# Patient Record
Sex: FEMALE | Race: WHITE
[De-identification: names, ages, dates, MRNs, and addresses within clinical notes are randomized per-mention and may not be internally consistent; named-entity substitution may affect disease eponyms.]

---

## 2016-01-30 VITALS — DIASTOLIC BLOOD PRESSURE: 75 MMHG | SYSTOLIC BLOOD PRESSURE: 152 MMHG

## 2017-03-21 ENCOUNTER — HOSPITAL ENCOUNTER (OUTPATIENT)
Dept: HOSPITAL 61 - LAB | Age: 62
Discharge: HOME | End: 2017-03-21
Attending: NURSE PRACTITIONER
Payer: COMMERCIAL

## 2017-03-21 DIAGNOSIS — N39.0: Primary | ICD-10-CM

## 2017-03-21 LAB
ANION GAP SERPL CALC-SCNC: 11 MMOL/L (ref 6–14)
BUN SERPL-MCNC: 15 MG/DL (ref 7–20)
CALCIUM SERPL-MCNC: 9.7 MG/DL (ref 8.5–10.1)
CHLORIDE SERPL-SCNC: 102 MMOL/L (ref 98–107)
CO2 SERPL-SCNC: 30 MMOL/L (ref 21–32)
CREAT SERPL-MCNC: 1.1 MG/DL (ref 0.6–1)
GFR SERPLBLD BASED ON 1.73 SQ M-ARVRAT: 50 ML/MIN
GLUCOSE SERPL-MCNC: 112 MG/DL (ref 70–99)
POTASSIUM SERPL-SCNC: 3.2 MMOL/L (ref 3.5–5.1)
SODIUM SERPL-SCNC: 143 MMOL/L (ref 136–145)

## 2017-03-21 PROCEDURE — 36415 COLL VENOUS BLD VENIPUNCTURE: CPT

## 2017-03-21 PROCEDURE — 80048 BASIC METABOLIC PNL TOTAL CA: CPT

## 2017-03-30 ENCOUNTER — HOSPITAL ENCOUNTER (OUTPATIENT)
Dept: HOSPITAL 61 - ENDOS | Age: 62
Discharge: HOME | End: 2017-03-30
Attending: INTERNAL MEDICINE
Payer: COMMERCIAL

## 2017-03-30 VITALS
DIASTOLIC BLOOD PRESSURE: 54 MMHG | SYSTOLIC BLOOD PRESSURE: 119 MMHG | SYSTOLIC BLOOD PRESSURE: 119 MMHG | DIASTOLIC BLOOD PRESSURE: 54 MMHG | DIASTOLIC BLOOD PRESSURE: 54 MMHG | DIASTOLIC BLOOD PRESSURE: 54 MMHG | SYSTOLIC BLOOD PRESSURE: 119 MMHG | SYSTOLIC BLOOD PRESSURE: 119 MMHG | DIASTOLIC BLOOD PRESSURE: 54 MMHG | SYSTOLIC BLOOD PRESSURE: 119 MMHG | SYSTOLIC BLOOD PRESSURE: 119 MMHG | DIASTOLIC BLOOD PRESSURE: 54 MMHG

## 2017-03-30 DIAGNOSIS — E78.5: ICD-10-CM

## 2017-03-30 DIAGNOSIS — M19.90: ICD-10-CM

## 2017-03-30 DIAGNOSIS — I10: ICD-10-CM

## 2017-03-30 DIAGNOSIS — K22.70: Primary | ICD-10-CM

## 2017-03-30 DIAGNOSIS — K29.50: ICD-10-CM

## 2017-03-30 DIAGNOSIS — F32.9: ICD-10-CM

## 2017-03-30 DIAGNOSIS — J44.9: ICD-10-CM

## 2017-03-30 DIAGNOSIS — F17.200: ICD-10-CM

## 2017-03-30 DIAGNOSIS — F41.9: ICD-10-CM

## 2017-03-30 DIAGNOSIS — J45.909: ICD-10-CM

## 2017-03-30 PROCEDURE — 43239 EGD BIOPSY SINGLE/MULTIPLE: CPT

## 2017-03-31 NOTE — PATHOLOGY
PATHOLOGY REPORT 

 

*    *    *    *    *    *    *    * 

 FINAL DIAGNOSIS:

Esophagus, distal, biopsy:

- Hyperplastic squamous epithelium without significant inflammation.

- Scant adjacent columnar epithelium with mild to moderate chronic

inflammation and no evidence of intestinal metaplasia.

(OLGA:; d/t: 3/31/17) 

 

 

REPORT ELECTRONICALLY SIGNED BY:   ZELALEM Blackwood M.D.

DATE/TIME:   3/31/2017 11:33

*    *    *    *    *    *    *    *

 

GROSS PATHOLOGY:

Received in formalin labeled "Celestino Garcia and distal esophageal

bx," are 4 segments of tan soft tissue measuring 1.7 x 0.2 x 0.2 cm

in aggregate dimensions and ranging from 0.3 to 0.6 cm in maximum

dimension.  The specimen is submitted entirely in cassette A1.

(TTL; 3/30/2017)

 

 

 INITIAL CPT CODE(S):

A; 89605

Professional services performed by [facility] at 

[place] [address]

 

  Technical services performed by [facility] at [address] [place] [address], [facility]

[address] [address].

  

 SPECIMEN(S) RECEIVED:

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

 

 CLINICAL HISTORY:

Chandler's, GERD

 

 PATIENT:  CELESTINO GARCIA

/AGE:  1955 (Age: 62)  

PATIENT #:  [id number]

ACCESSION #:  [id number]          ALT CASE #:   

SPECIMEN COLLECTION DATE:  3/30/2017

SPECIMEN RECEIVED DATE:  3/30/2017

   

[facility] - [address] [place] [address] - PHONE: 

[phone]

* * *  END OF REPORT  * * *

## 2017-07-27 ENCOUNTER — HOSPITAL ENCOUNTER (OUTPATIENT)
Dept: HOSPITAL 61 - LAB | Age: 62
Discharge: HOME | End: 2017-07-27
Attending: NURSE PRACTITIONER
Payer: COMMERCIAL

## 2017-07-27 DIAGNOSIS — Z01.419: Primary | ICD-10-CM

## 2017-07-27 LAB
ALBUMIN SERPL-MCNC: 3.6 G/DL (ref 3.4–5)
ALBUMIN/GLOB SERPL: 1 {RATIO} (ref 1–1.7)
ALP SERPL-CCNC: 156 U/L (ref 46–116)
ALT SERPL-CCNC: 20 U/L (ref 14–59)
ANION GAP SERPL CALC-SCNC: 9 MMOL/L (ref 6–14)
AST SERPL-CCNC: 26 U/L (ref 15–37)
BASOPHILS # BLD AUTO: 0 X10^3/UL (ref 0–0.2)
BASOPHILS NFR BLD: 1 % (ref 0–3)
BILIRUB SERPL-MCNC: 0.5 MG/DL (ref 0.2–1)
BUN SERPL-MCNC: 18 MG/DL (ref 7–20)
BUN/CREAT SERPL: 18 (ref 6–20)
CALCIUM SERPL-MCNC: 9.3 MG/DL (ref 8.5–10.1)
CHLORIDE SERPL-SCNC: 106 MMOL/L (ref 98–107)
CHOLEST SERPL-MCNC: 151 MG/DL (ref 0–200)
CHOLEST/HDLC SERPL: 3.1 {RATIO}
CK SERPL-CCNC: 248 U/L (ref 26–192)
CKMB MASS: 1.8 NG/ML (ref 0–3.6)
CO2 SERPL-SCNC: 29 MMOL/L (ref 21–32)
CREAT SERPL-MCNC: 1 MG/DL (ref 0.6–1)
EOSINOPHIL NFR BLD: 4 % (ref 0–3)
ERYTHROCYTE [DISTWIDTH] IN BLOOD BY AUTOMATED COUNT: 14.2 % (ref 11.5–14.5)
GFR SERPLBLD BASED ON 1.73 SQ M-ARVRAT: 55.8 ML/MIN
GLOBULIN SER-MCNC: 3.7 G/DL (ref 2.2–3.8)
GLUCOSE SERPL-MCNC: 100 MG/DL (ref 70–99)
HCT VFR BLD CALC: 35.9 % (ref 36–47)
HDLC SERPL-MCNC: 49 MG/DL (ref 40–60)
HGB BLD-MCNC: 12.5 G/DL (ref 12–15.5)
LYMPHOCYTES # BLD: 1.5 X10^3/UL (ref 1–4.8)
LYMPHOCYTES NFR BLD AUTO: 36 % (ref 24–48)
MCH RBC QN AUTO: 30 PG (ref 25–35)
MCHC RBC AUTO-ENTMCNC: 35 G/DL (ref 31–37)
MCV RBC AUTO: 86 FL (ref 79–100)
MONOCYTES NFR BLD: 8 % (ref 0–9)
NEUTROPHILS NFR BLD AUTO: 52 % (ref 31–73)
NONHDLC SERPL-MCNC: 102 MG/DL (ref 0–129)
PLATELET # BLD AUTO: 220 X10^3/UL (ref 140–400)
POTASSIUM SERPL-SCNC: 3.9 MMOL/L (ref 3.5–5.1)
PROT SERPL-MCNC: 7.3 G/DL (ref 6.4–8.2)
RBC # BLD AUTO: 4.16 X10^6/UL (ref 3.5–5.4)
SODIUM SERPL-SCNC: 144 MMOL/L (ref 136–145)
TRIGL SERPL-MCNC: 85 MG/DL (ref 0–150)
WBC # BLD AUTO: 4.1 X10^3/UL (ref 4–11)

## 2017-07-27 PROCEDURE — 80053 COMPREHEN METABOLIC PANEL: CPT

## 2017-07-27 PROCEDURE — 85027 COMPLETE CBC AUTOMATED: CPT

## 2017-07-27 PROCEDURE — 80061 LIPID PANEL: CPT

## 2017-07-27 PROCEDURE — 84443 ASSAY THYROID STIM HORMONE: CPT

## 2017-07-27 PROCEDURE — 82306 VITAMIN D 25 HYDROXY: CPT

## 2017-07-27 PROCEDURE — 82553 CREATINE MB FRACTION: CPT

## 2017-07-27 PROCEDURE — 36415 COLL VENOUS BLD VENIPUNCTURE: CPT

## 2017-08-28 ENCOUNTER — HOSPITAL ENCOUNTER (OUTPATIENT)
Dept: HOSPITAL 61 - LAB | Age: 62
Discharge: HOME | End: 2017-08-28
Attending: NURSE PRACTITIONER
Payer: COMMERCIAL

## 2017-08-28 DIAGNOSIS — R74.8: Primary | ICD-10-CM

## 2017-08-28 PROCEDURE — 36415 COLL VENOUS BLD VENIPUNCTURE: CPT

## 2017-08-28 PROCEDURE — 84075 ASSAY ALKALINE PHOSPHATASE: CPT

## 2017-09-01 ENCOUNTER — HOSPITAL ENCOUNTER (OUTPATIENT)
Dept: HOSPITAL 61 - CT | Age: 62
Discharge: HOME | End: 2017-09-01
Attending: NURSE PRACTITIONER
Payer: COMMERCIAL

## 2017-09-01 ENCOUNTER — HOSPITAL ENCOUNTER (OUTPATIENT)
Dept: HOSPITAL 61 - US | Age: 62
Discharge: HOME | End: 2017-09-01
Attending: INTERNAL MEDICINE
Payer: COMMERCIAL

## 2017-09-01 DIAGNOSIS — J98.11: ICD-10-CM

## 2017-09-01 DIAGNOSIS — I87.2: Primary | ICD-10-CM

## 2017-09-01 DIAGNOSIS — D35.01: ICD-10-CM

## 2017-09-01 DIAGNOSIS — D35.01: Primary | ICD-10-CM

## 2017-09-01 PROCEDURE — 71250 CT THORAX DX C-: CPT

## 2017-09-01 PROCEDURE — 74150 CT ABDOMEN W/O CONTRAST: CPT

## 2017-09-01 PROCEDURE — 93970 EXTREMITY STUDY: CPT

## 2017-09-01 NOTE — RAD
Exam performed: CT chest and abdomen without contrast.



History: Follow-up pulmonary and adrenal nodule.



Date of service: 09/01/17. Comparison: CT chest and abdomen from 09/09/16.



Technique: Contiguous helical acquisitions are obtained through the chest and

abdomen without IV contrast. Sagittal and coronal reformatted images are

obtained and reviewed.



CT chest findings:



Structures at the thoracic inlet through both lobes of the thyroid gland

appear normal. Level IV contrast limits evaluation of neck and intrathoracic

great vessels, however they appear normal in course and caliber. Atheromatous

calcification of the aorta and coronary arteries is seen. No dominant

mediastinal or hilar adenopathy is seen. Heart size is within limits of normal

without pericardial effusion. Interrogation of lungs demonstrates no focal

infiltrates or nodules. Previously seen tiny peripheral nodule in the right

apex is unchanged. There is linear right basilar atelectasis. No pleural

effusion or pneumothorax. Interrogation of bone windows demonstrates no bony

abnormalities. Mild spondylotic changes are seen.



Impression:



1.  Previously seen tiny parenchymal opacity in the right upper lobe appears

similar End impression.

2.  No additional parenchymal abnormalities noted.



End impression



CT abdomen findings:



Low attenuating 2.8 x 2.6 cm right adrenal nodule is stable.



Lack of IV contrast limits evaluation of abdominal viscera however the liver,

spleen and pancreas appear normal. Cholecystectomy. Left adrenal gland and

bilateral kidneys are normal. There is no hydronephrosis or perinephric

stranding. Mild atheromatous calcification of the aorta. Small and large bowel

loops are nondilated and unremarkable. Scattered stool in the colon. No free

fluid. Bones are normal.



Impression:



1.  Stable low attenuating right adrenal nodule system with residual adenoma.

2.  No additional abnormality seen.



PQRS Compliance Statement:



One or more of the following individualized dose reduction techniques were

utilized for this examination:

1. Automated exposure control

2. Adjustment of the mA and/or kV according to patient size

3. Use of iterative reconstruction technique

## 2017-09-27 ENCOUNTER — HOSPITAL ENCOUNTER (OUTPATIENT)
Dept: HOSPITAL 61 - US | Age: 62
Discharge: HOME | End: 2017-09-27
Attending: INTERNAL MEDICINE
Payer: COMMERCIAL

## 2017-09-27 DIAGNOSIS — Z98.890: ICD-10-CM

## 2017-09-27 DIAGNOSIS — I82.493: Primary | ICD-10-CM

## 2017-09-27 PROCEDURE — 93970 EXTREMITY STUDY: CPT

## 2017-09-29 NOTE — RAD
--------------- APPROVED REPORT --------------





Bilateral Lower Extremity Venous Study for DVT

Patient Location: OUT-PATIENT



Indications

s/p b/l gsv ablations



Vein Imaging (Right)

CFV (R): Compressible

SFJ (R): Compressible

FEM (R): Compressible

POP (R): Compressible

DFV (R): Compressible

PTV (R): Spontaneous

GSV (R): Absent Flow

Peroneals (R): Spontaneous



Vein Imaging (Left)

CFV (L): Compressible

SFJ (L): Compressible

FEM (L): Compressible

POP (L): Compressible

DFV (L): Compressible

PTV (L): Spontaneous

GSV (L): Absent Flow

Peroneals (L): Spontaneous



Doppler Evaluation (Right)

CFV (R): Spontaneous

POP (R):Spontaneous



Doppler Evaluation (Left)

CFV (L):Spontaneous

POP (L):Spontaneous



Findings

The bilateral deep veins of the lower extremities were evaluated for thrombus. The bilateral saphenof
emoral junctions and common femoral veins do not demonstrate any evidence of thrombus and are fully c
ompressible. The bilateral saphenofemoral veins were not well visualized but grossly do not demonstra
te any evidence of thrombus and appear to be compressible with normal color Doppler and spectral flow
. Bilateral popliteal veins are compressible with normal spectral flow. The below-knee veins are not 
well visualized but demonstrate spontaneous flow. The bilateral greater saphenous veins on limited pr
oximal imaging appear to demonstrate no evidence of flow consistent with recent history of ablation.



Critical Notification

Critical Value: No



<Conclusion>

Negative for thrombus in the bilateral deep veins of the lower extremities

Successful bilateral greater saphenous vein ablations.

## 2017-10-25 ENCOUNTER — HOSPITAL ENCOUNTER (EMERGENCY)
Dept: HOSPITAL 61 - ER | Age: 62
Discharge: HOME | End: 2017-10-25
Payer: COMMERCIAL

## 2017-10-25 VITALS
DIASTOLIC BLOOD PRESSURE: 79 MMHG | SYSTOLIC BLOOD PRESSURE: 173 MMHG | DIASTOLIC BLOOD PRESSURE: 79 MMHG | SYSTOLIC BLOOD PRESSURE: 173 MMHG | SYSTOLIC BLOOD PRESSURE: 173 MMHG | DIASTOLIC BLOOD PRESSURE: 79 MMHG | DIASTOLIC BLOOD PRESSURE: 79 MMHG | SYSTOLIC BLOOD PRESSURE: 173 MMHG | SYSTOLIC BLOOD PRESSURE: 173 MMHG | DIASTOLIC BLOOD PRESSURE: 79 MMHG | SYSTOLIC BLOOD PRESSURE: 173 MMHG | DIASTOLIC BLOOD PRESSURE: 79 MMHG | SYSTOLIC BLOOD PRESSURE: 173 MMHG | SYSTOLIC BLOOD PRESSURE: 173 MMHG | DIASTOLIC BLOOD PRESSURE: 79 MMHG | DIASTOLIC BLOOD PRESSURE: 79 MMHG

## 2017-10-25 DIAGNOSIS — E78.00: ICD-10-CM

## 2017-10-25 DIAGNOSIS — F32.9: ICD-10-CM

## 2017-10-25 DIAGNOSIS — F41.9: ICD-10-CM

## 2017-10-25 DIAGNOSIS — Z91.041: ICD-10-CM

## 2017-10-25 DIAGNOSIS — I10: ICD-10-CM

## 2017-10-25 DIAGNOSIS — N30.00: Primary | ICD-10-CM

## 2017-10-25 DIAGNOSIS — Z88.8: ICD-10-CM

## 2017-10-25 DIAGNOSIS — J44.9: ICD-10-CM

## 2017-10-25 DIAGNOSIS — K21.9: ICD-10-CM

## 2017-10-25 LAB
BACTERIA #/AREA URNS HPF: (no result) /HPF
BILIRUB UR QL STRIP: NEGATIVE
GLUCOSE UR STRIP-MCNC: NEGATIVE MG/DL
NITRITE UR QL STRIP: NEGATIVE
PH UR STRIP: 5.5 [PH]
PROT UR STRIP-MCNC: 30 MG/DL
SP GR UR STRIP: 1.02
SQUAMOUS #/AREA URNS LPF: (no result) /LPF
UROBILINOGEN UR-MCNC: 0.2 MG/DL
WBC #/AREA URNS HPF: >40 /HPF (ref 0–4)

## 2017-10-25 PROCEDURE — 81001 URINALYSIS AUTO W/SCOPE: CPT

## 2017-10-25 PROCEDURE — 87086 URINE CULTURE/COLONY COUNT: CPT

## 2017-10-25 PROCEDURE — 99284 EMERGENCY DEPT VISIT MOD MDM: CPT

## 2017-10-25 NOTE — PHYS DOC
Past Medical History


Past Medical History:  Anxiety, Asthma, COPD, Depression, GERD, High Cholesterol

, Hypertension, Other


Additional Past Medical Histor:  TREMOR


Past Surgical History:  Cholecystectomy, Hysterectomy, Other


Alcohol Use:  None


Drug Use:  None





Adult General


Chief Complaint


Chief Complaint:  PAIN ON URINATION





[facility]


HPI





Patient is a 62  year old female with history of hypertension, high cholesterol

, acid reflex, who presents today with bladder spasms and frequency that began 

this morning. Patient denies any fever abdominal pain nausea vomiting. Patient 

denies any hematuria.





Review of Systems


Review of Systems





Constitutional: Denies fever or chills []


Eyes: Denies change in visual acuity, redness, or eye pain []


HENT: Denies nasal congestion or sore throat []


Respiratory: Denies cough or shortness of breath []


Cardiovascular: No additional information not addressed in [facility] []


GI: Denies abdominal pain, nausea, vomiting, bloody stools or diarrhea []


: Bladder spasms and frequency, denies hematuria 


Musculoskeletal: Denies back pain or joint pain []


Integument: Denies rash or skin lesions []


Neurologic: Denies headache, focal weakness or sensory changes []


Endocrine: Denies polyuria or polydipsia []





Allergies


Allergies





Allergies








Coded Allergies Type Severity Reaction Last Updated Verified


 


  regadenoson Allergy Severe LIPS SWELLED AND HIVES ON CHEST AND ARMS 3/30/17 

Yes


 


  I S O L A T I O N *CONTACT* Allergy Unknown  3/30/17 Yes











Physical Exam


Physical Exam





Constitutional: Well developed, well nourished, no acute distress, non-toxic 

appearance. []


HENT: Normocephalic, atraumatic, bilateral external ears normal, oropharynx 

moist, no oral exudates, nose normal. []


Eyes: PERRLA, EOMI, conjunctiva normal, no discharge. [] 


Neck: Normal range of motion, no tenderness, supple, no stridor. [] 


Cardiovascular:Heart rate regular rhythm, no murmur []


Lungs & Thorax:  Bilateral breath sounds clear to auscultation []


Abdomen: Bowel sounds normal, soft, no tenderness, no masses, no pulsatile 

masses. [] 


Skin: Warm, dry, no erythema, no rash. [] 


Back: No tenderness, no CVA tenderness. [] 


Extremities: No tenderness, no cyanosis, no clubbing, ROM intact, no edema. [] 


Neurologic: Alert and oriented X 3, normal motor function, normal sensory 

function, no focal deficits noted. Essential tremors noted. 


Psychologic: Affect normal, judgement normal, mood normal. []





Current Patient Data


Vital Signs





 Vital Signs








  Date Time  Temp Pulse Resp B/P (MAP) Pulse Ox O2 Delivery O2 Flow Rate FiO2


 


10/25/17 07:56 99.1 81 16 173/79 (110) 97 Room Air  





 99.1       








Lab Values





 Laboratory Tests








Test


  10/25/17


08:05


 


Urine Collection Type Unknown  


 


Urine Color Yellow  


 


Urine Clarity Cloudy  


 


Urine pH 5.5  


 


Urine Specific Gravity 1.020  


 


Urine Protein


  30 mg/dL


(NEG-TRACE)


 


Urine Glucose (UA)


  Negative mg/dL


(NEG)


 


Urine Ketones (Stick)


  Negative mg/dL


(NEG)


 


Urine Blood Large (NEG)  


 


Urine Nitrite


  Negative (NEG)


 


 


Urine Bilirubin


  Negative (NEG)


 


 


Urine Urobilinogen Dipstick


  0.2 mg/dL (0.2


mg/dL)


 


Urine Leukocyte Esterase Large (NEG)  


 


Urine RBC


  11-20 /HPF


(0-2)


 


Urine WBC


  >40 /HPF (0-4)


 


 


Urine Squamous Epithelial


Cells Few /LPF  


 


 


Urine Bacteria


  Few /HPF


(0-FEW)











EKG


EKG


[]





Radiology/Procedures


Radiology/Procedures


[]





Course & Med Decision Making


Course & Med Decision Making


Pertinent Labs and Imaging studies reviewed. (See chart for details)





This is a 62-year-old female patient presenting with bladder spasms and 

frequency. Urine positive for UTI. Discharged with Cipro and Pyridium. Push 

fluids. OTC pain relievers.





BP was 173/79 with hx of HTN-advised to take her BP medicines and follow up 

with PCP.





Anuj Disclaimer


Anuj Disclaimer


This electronic medical record was generated, in whole or in part, using a 

voice recognition dictation system.





Departure


Departure


Impression:  


 Primary Impression:  


 Urinary tract infection


 Additional Impression:  


 Hypertension


Disposition:  01 HOME, SELF-CARE


Condition:  STABLE


Referrals:  


RANCHO ROYAL MD (PCP)


Follow-up in one week


Patient Instructions:  Hypertension, Urinary Tract Infection, Easy-to-Read





Additional Instructions:  


You were seen with urinary tract infection. Ensure you complete your 

antibiotics. Take over-the-counter pain relievers and the prescribed Pyridium 

as needed for pain. Push fluids. Your blood pressure was 173/79. This is 

elevated. Ensure you are taking your blood pressure medicines and follow up 

with your primary care doctor in seven days. Come back to the ED if symptoms 

worsen.


Scripts


Phenazopyridine Hcl (PYRIDIUM) 100 Mg Tablet


100 MG PO TID, #8 TAB


   Prov: ALBINO WONG         10/25/17 


Ciprofloxacin Hcl (CIPRO) 500 Mg Tablet


1 TAB PO BID, #14 TAB


   Prov: ALBINO WONG         10/25/17





Problem Qualifiers








 Primary Impression:  


 Urinary tract infection


 Urinary tract infection type:  acute cystitis  Hematuria presence:  without 

hematuria  Qualified Codes:  N30.00 - Acute cystitis without hematuria


 Additional Impression:  


 Hypertension


 Hypertension type:  unspecified  Qualified Codes:  I10 - Essential (primary) 

hypertension








ALBINO WONG Oct 25, 2017 08:10

## 2018-03-08 ENCOUNTER — HOSPITAL ENCOUNTER (OUTPATIENT)
Dept: HOSPITAL 61 - MRI | Age: 63
Discharge: HOME | End: 2018-03-08
Attending: INTERNAL MEDICINE
Payer: COMMERCIAL
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
## 2018-03-08 DIAGNOSIS — S43.432A: Primary | ICD-10-CM
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
## 2018-03-08 PROCEDURE — 73221 MRI JOINT UPR EXTREM W/O DYE: CPT

## 2018-03-15 ENCOUNTER — HOSPITAL ENCOUNTER (OUTPATIENT)
Dept: HOSPITAL 61 - LAB | Age: 63
Discharge: HOME | End: 2018-03-15
Attending: INTERNAL MEDICINE
Payer: COMMERCIAL

## 2018-03-15 DIAGNOSIS — E27.9: ICD-10-CM

## 2018-03-15 DIAGNOSIS — I10: Primary | ICD-10-CM

## 2018-03-15 DIAGNOSIS — M25.512: ICD-10-CM

## 2018-03-15 DIAGNOSIS — E78.4: ICD-10-CM

## 2018-03-15 LAB
ADD MAN DIFF?: NO
ALBUMIN SERPL-MCNC: 3.9 G/DL (ref 3.4–5)
ALBUMIN/GLOB SERPL: 1 {RATIO} (ref 1–1.7)
ALP SERPL-CCNC: 145 U/L (ref 46–116)
ALT (SGPT): 23 U/L (ref 14–59)
ANION GAP SERPL CALC-SCNC: 8 MMOL/L (ref 6–14)
AST SERPL-CCNC: 24 U/L (ref 15–37)
BASO #: 0 X10^3/UL (ref 0–0.2)
BASO %: 0 % (ref 0–3)
BLOOD UREA NITROGEN: 19 MG/DL (ref 7–20)
BUN/CREAT SERPL: 19 (ref 6–20)
CALCIUM: 9.8 MG/DL (ref 8.5–10.1)
CHLORIDE: 104 MMOL/L (ref 98–107)
CHOLESTEROL/HDL RATIO: 4.7
CHOLESTEROL: 263 MG/DL (ref 0–200)
CO2 SERPL-SCNC: 27 MMOL/L (ref 21–32)
CREAT SERPL-MCNC: 1 MG/DL (ref 0.6–1)
EOS #: 0.1 X10^3/UL (ref 0–0.7)
EOS %: 2 % (ref 0–3)
FREE T4: 0.88 NG/DL (ref 0.76–1.46)
GFR SERPLBLD BASED ON 1.73 SQ M-ARVRAT: 55.6 ML/MIN
GLOBULIN SER-MCNC: 3.8 G/DL (ref 2.2–3.8)
GLUCOSE SERPL-MCNC: 86 MG/DL (ref 70–99)
HCG SERPL-ACNC: 5.8 X10^3/UL (ref 4–11)
HDLC: 56 MG/DL (ref 40–60)
HEMATOCRIT: 38.4 % (ref 36–47)
HEMOGLOBIN: 12.8 G/DL (ref 12–15.5)
LDLC: 178 MG/DL (ref 0–100)
LYMPH #: 2.4 X10^3/UL (ref 1–4.8)
LYMPH %: 41 % (ref 24–48)
MEAN CORPUSCULAR HEMOGLOBIN: 30 PG (ref 25–35)
MEAN CORPUSCULAR HGB CONC: 33 G/DL (ref 31–37)
MEAN CORPUSCULAR VOLUME: 89 FL (ref 79–100)
MONO #: 0.5 X10^3/UL (ref 0–1.1)
MONO %: 8 % (ref 0–9)
NEUT #: 2.8 X10^3UL (ref 1.8–7.7)
NEUT %: 49 % (ref 31–73)
NON-HDL CHOLESTEROL: 207 MG/DL (ref 0–129)
PLATELET COUNT: 254 X10^3/UL (ref 140–400)
POTASSIUM SERPL-SCNC: 3.9 MMOL/L (ref 3.5–5.1)
RED BLOOD COUNT: 4.31 X10^6/UL (ref 3.5–5.4)
RED CELL DISTRIBUTION WIDTH: 14.6 % (ref 11.5–14.5)
SEDIMENTATION RATE: 27 (ref 0–25)
SODIUM: 139 MMOL/L (ref 136–145)
THYROID STIM HORMONE (TSH): 1.3 UIU/ML (ref 0.36–3.74)
TOTAL BILIRUBIN: 0.5 MG/DL (ref 0.2–1)
TOTAL PROTEIN: 7.7 G/DL (ref 6.4–8.2)
TRIGLYCERIDES: 147 MG/DL (ref 0–150)
VLDLC: 29 MG/DL (ref 0–40)

## 2018-03-15 PROCEDURE — 36415 COLL VENOUS BLD VENIPUNCTURE: CPT

## 2018-03-15 PROCEDURE — 85025 COMPLETE CBC W/AUTO DIFF WBC: CPT

## 2018-03-15 PROCEDURE — 84443 ASSAY THYROID STIM HORMONE: CPT

## 2018-03-15 PROCEDURE — 86431 RHEUMATOID FACTOR QUANT: CPT

## 2018-03-15 PROCEDURE — 80061 LIPID PANEL: CPT

## 2018-03-15 PROCEDURE — 85651 RBC SED RATE NONAUTOMATED: CPT

## 2018-03-15 PROCEDURE — 84439 ASSAY OF FREE THYROXINE: CPT

## 2018-03-15 PROCEDURE — 80053 COMPREHEN METABOLIC PANEL: CPT

## 2018-03-16 LAB — HBV SURFACE AG SERPL QL IA: <10 IU/ML (ref 0–13.9)

## 2018-04-09 ENCOUNTER — HOSPITAL ENCOUNTER (OUTPATIENT)
Dept: HOSPITAL 61 - RAD | Age: 63
Discharge: HOME | End: 2018-04-09
Attending: NURSE PRACTITIONER
Payer: COMMERCIAL

## 2018-04-09 DIAGNOSIS — E78.5: ICD-10-CM

## 2018-04-09 DIAGNOSIS — I10: ICD-10-CM

## 2018-04-09 DIAGNOSIS — Z87.891: ICD-10-CM

## 2018-04-09 DIAGNOSIS — J18.9: Primary | ICD-10-CM

## 2018-04-09 PROCEDURE — 71046 X-RAY EXAM CHEST 2 VIEWS: CPT

## 2018-06-18 ENCOUNTER — HOSPITAL ENCOUNTER (OUTPATIENT)
Dept: HOSPITAL 61 - LAB | Age: 63
Discharge: HOME | End: 2018-06-18
Attending: INTERNAL MEDICINE
Payer: COMMERCIAL

## 2018-06-18 DIAGNOSIS — E78.5: Primary | ICD-10-CM

## 2018-06-18 LAB
ALBUMIN SERPL-MCNC: 3.5 G/DL (ref 3.4–5)
ALBUMIN/GLOB SERPL: 1 {RATIO} (ref 1–1.7)
ALP SERPL-CCNC: 131 U/L (ref 46–116)
ALT (SGPT): 25 U/L (ref 14–59)
ANION GAP SERPL CALC-SCNC: 9 MMOL/L (ref 6–14)
AST SERPL-CCNC: 19 U/L (ref 15–37)
BLOOD UREA NITROGEN: 14 MG/DL (ref 7–20)
BUN/CREAT SERPL: 12 (ref 6–20)
CALCIUM: 9.2 MG/DL (ref 8.5–10.1)
CHLORIDE: 105 MMOL/L (ref 98–107)
CHOLESTEROL/HDL RATIO: 3.4
CHOLESTEROL: 167 MG/DL (ref 0–200)
CK SERPL-CCNC: 95 U/L (ref 26–192)
CO2 SERPL-SCNC: 28 MMOL/L (ref 21–32)
CREAT SERPL-MCNC: 1.2 MG/DL (ref 0.6–1)
GFR SERPLBLD BASED ON 1.73 SQ M-ARVRAT: 45.1 ML/MIN
GLOBULIN SER-MCNC: 3.4 G/DL (ref 2.2–3.8)
GLUCOSE SERPL-MCNC: 89 MG/DL (ref 70–99)
HDLC: 49 MG/DL (ref 40–60)
LDLC: 96 MG/DL (ref 0–100)
NON-HDL CHOLESTEROL: 118 MG/DL (ref 0–129)
POTASSIUM SERPL-SCNC: 4.1 MMOL/L (ref 3.5–5.1)
SODIUM: 142 MMOL/L (ref 136–145)
TOTAL BILIRUBIN: 0.5 MG/DL (ref 0.2–1)
TOTAL PROTEIN: 6.9 G/DL (ref 6.4–8.2)
TRIGLYCERIDES: 109 MG/DL (ref 0–150)
VLDLC: 22 MG/DL (ref 0–40)

## 2018-06-18 PROCEDURE — 82550 ASSAY OF CK (CPK): CPT

## 2018-06-18 PROCEDURE — 80061 LIPID PANEL: CPT

## 2018-06-18 PROCEDURE — 80053 COMPREHEN METABOLIC PANEL: CPT

## 2018-06-18 PROCEDURE — 36415 COLL VENOUS BLD VENIPUNCTURE: CPT

## 2018-08-30 ENCOUNTER — HOSPITAL ENCOUNTER (OUTPATIENT)
Dept: HOSPITAL 61 - KCIC MAMMO | Age: 63
Discharge: HOME | End: 2018-08-30
Attending: PHYSICIAN ASSISTANT
Payer: COMMERCIAL

## 2018-08-30 DIAGNOSIS — E78.5: ICD-10-CM

## 2018-08-30 DIAGNOSIS — E78.00: ICD-10-CM

## 2018-08-30 DIAGNOSIS — Z96.642: ICD-10-CM

## 2018-08-30 DIAGNOSIS — Z78.0: ICD-10-CM

## 2018-08-30 DIAGNOSIS — Z88.8: ICD-10-CM

## 2018-08-30 DIAGNOSIS — Z13.820: ICD-10-CM

## 2018-08-30 DIAGNOSIS — Z86.010: ICD-10-CM

## 2018-08-30 DIAGNOSIS — Z82.49: ICD-10-CM

## 2018-08-30 DIAGNOSIS — Z87.891: ICD-10-CM

## 2018-08-30 DIAGNOSIS — J44.9: ICD-10-CM

## 2018-08-30 DIAGNOSIS — Z12.31: Primary | ICD-10-CM

## 2018-08-30 DIAGNOSIS — I10: ICD-10-CM

## 2018-08-30 DIAGNOSIS — Z90.49: ICD-10-CM

## 2018-08-30 PROCEDURE — 77063 BREAST TOMOSYNTHESIS BI: CPT

## 2018-08-30 PROCEDURE — 77067 SCR MAMMO BI INCL CAD: CPT

## 2018-08-30 PROCEDURE — 77080 DXA BONE DENSITY AXIAL: CPT

## 2018-08-30 NOTE — KCIC
Bilateral digital screening mammograms with 3D Tomosynthesis:

 

Reason for examination: Routine screening.

 

Comparison is made to previous studies dated 1/24/2017 and 1/12/2016.

 

Bilateral mammograms in CC and oblique projections were obtained with 2-D 

imaging and 3-D tomosynthesis imaging on a Siemens Inspiration unit and 

reviewed on the workstation. Interpretation was made with the benefit of 

CAD.

 

The skin and nipples show no abnormalities. No abnormal axillary lymph 

nodes are seen. The breast parenchyma shows scattered fibroglandular 

density. (Breast density: Category B.) There continues to be a small 

nodule in the 3:00 A position of the right breast which is unchanged. 

There is however a small nodule developing at the 6:00 B position of the 

left breast measuring 5.2 mm in size. This is fairly well-circumscribed 

and may represent a cyst but recommend further evaluation with ultrasound.

There are no other new dominant masses, suspicious calcifications or 

architectural distortions.

 

Impression:

 

5.2 mm circumscribed nodule at the 6:00 B position of the left breast. 

Recommend further evaluation with ultrasound.

 

BI-RADS Category 0: Incomplete. Needs additional imaging evaluation.

 

"Our facility is accredited by the American [employer] 

Mammography Program."

 

This patient's information has been entered into a reminder system for the

patient to be notified with the results of her examination and a target 

date for the next mammogram.

 

Electronically signed by: Brandee Lopez MD (8/30/2018 8:33 PM) [facility]

## 2018-08-30 NOTE — KCIC
EXAM: Dual energy x-ray absorptiometry (DEXA).

 

HISTORY: Postmenopausal female presents for osteoporosis screening.

 

COMPARISON: None.

 

TECHNIQUE: Dual energy x-ray absorptiometry of the lumbar spine and right 

hip was performed.  Calculation of bone mineral density based on standard 

deviations above or below the expected young adult normal value (T-score) 

was completed. 

 

FINDINGS: The average bone mineral density in the 1st through 4th lumbar 

vertebrae is 0.955 g/cmxcm, corresponding with a T-score of -0.8.

 

The average total bone mineral density in the right hip is 0.830 g/cmxcm, 

corresponding with a  T-score of -0.9.

 

IMPRESSION:   

 

Normal bone mineral density.

 

Note: Definitions established by the World Health Organization:

 

1. Normal: T-score is -1.0 or above.

2. Osteopenia: T-score is between -1.0 and -2.5 .

3. Osteoporosis: T-score is -2.5 or below. 

 

Electronically signed by: Carmen Howard MD (8/30/2018 2:22 PM) [facility]

## 2018-09-06 ENCOUNTER — HOSPITAL ENCOUNTER (OUTPATIENT)
Dept: HOSPITAL 61 - CT | Age: 63
Discharge: HOME | End: 2018-09-06
Attending: INTERNAL MEDICINE
Payer: COMMERCIAL

## 2018-09-06 DIAGNOSIS — E78.00: ICD-10-CM

## 2018-09-06 DIAGNOSIS — Z90.710: ICD-10-CM

## 2018-09-06 DIAGNOSIS — Z88.8: ICD-10-CM

## 2018-09-06 DIAGNOSIS — Z87.891: ICD-10-CM

## 2018-09-06 DIAGNOSIS — I10: ICD-10-CM

## 2018-09-06 DIAGNOSIS — K21.9: ICD-10-CM

## 2018-09-06 DIAGNOSIS — Z82.49: ICD-10-CM

## 2018-09-06 DIAGNOSIS — I70.0: ICD-10-CM

## 2018-09-06 DIAGNOSIS — M16.0: ICD-10-CM

## 2018-09-06 DIAGNOSIS — Z90.49: ICD-10-CM

## 2018-09-06 DIAGNOSIS — J44.9: ICD-10-CM

## 2018-09-06 DIAGNOSIS — R91.1: ICD-10-CM

## 2018-09-06 DIAGNOSIS — Z86.010: ICD-10-CM

## 2018-09-06 DIAGNOSIS — I25.10: ICD-10-CM

## 2018-09-06 DIAGNOSIS — K44.9: Primary | ICD-10-CM

## 2018-09-06 LAB
BUN SERPL-MCNC: 15 MG/DL (ref 7–20)
CREAT SERPL-MCNC: 1.1 MG/DL (ref 0.6–1)
GFR SERPLBLD BASED ON 1.73 SQ M-ARVRAT: 49.8 ML/MIN

## 2018-09-06 PROCEDURE — 84520 ASSAY OF UREA NITROGEN: CPT

## 2018-09-06 PROCEDURE — 36415 COLL VENOUS BLD VENIPUNCTURE: CPT

## 2018-09-06 PROCEDURE — 82565 ASSAY OF CREATININE: CPT

## 2018-09-06 PROCEDURE — 71260 CT THORAX DX C+: CPT

## 2018-09-06 NOTE — RAD
CLINICAL HISTORY: SOA AND PT STATES F/U A "SPOT" IN CHEST

 

COMPARISON: 9/1/2017

 

TECHNIQUE: CT of the chest following the administration of intravenous 

contrast. Axial, coronal and sagittal reformatted images were generated.

 

---PQRS compliance statement - One or more of the following individualized

dose reduction techniques were utilized for this study:

1.  Automated exposure control

2.  Adjustment of the mA and/or kV according to patient size

3.  Use of iterative reconstruction technique---

 

FINDINGS:

CHEST: 

The heart is not enlarged. No pericardial effusion. Coronary artery 

calcifications are seen.

 

Atherosclerotic calcifications of the aorta are seen. No mediastinal or 

hilar lymphadenopathy by size criteria. No axillary lymphadenopathy. Base 

of the neck is unremarkable.

 

Dependent opacities in the peripheral right lower lobe likely 

atelectasis/scarring. A 5 mm right upper lobe lung nodule (series 2 image 

18) is stable to 9/1/2017. 

 

No pleural effusion or pneumothorax.

 

Visualized Upper abdomen:  Right upper pole renal subcentimeter hypodense 

lesion is too small to characterize. Left upper pole renal cystic lesion 

is seen. Small hiatal hernia. Cholecystectomy clips are seen.

 

Bones: Segmentation anomaly at T10-11 is seen with partial fusion of the 

vertebral bodies anteriorly. No definite aggressive osseous lesion is 

identified. 

 

IMPRESSION:

1.  5 mm right upper lobe lung nodule is stable when compared to 9/1/2017.

2.  Minimal dependent opacities in the peripheral right lower lobe likely 

atelectasis/scarring.

3.  No thoracic lymphadenopathy.

4.  Small hiatal hernia.

 

Electronically signed by: Telly Desai MD (9/6/2018 12:09 PM) [id number]

## 2018-09-19 ENCOUNTER — HOSPITAL ENCOUNTER (OUTPATIENT)
Dept: HOSPITAL 61 - LAB | Age: 63
Discharge: HOME | End: 2018-09-19
Attending: INTERNAL MEDICINE
Payer: COMMERCIAL

## 2018-09-19 DIAGNOSIS — Z90.49: ICD-10-CM

## 2018-09-19 DIAGNOSIS — Z90.710: ICD-10-CM

## 2018-09-19 DIAGNOSIS — Z82.49: ICD-10-CM

## 2018-09-19 DIAGNOSIS — Z86.010: ICD-10-CM

## 2018-09-19 DIAGNOSIS — Z88.8: ICD-10-CM

## 2018-09-19 DIAGNOSIS — Z87.891: ICD-10-CM

## 2018-09-19 DIAGNOSIS — R23.3: ICD-10-CM

## 2018-09-19 DIAGNOSIS — R06.02: Primary | ICD-10-CM

## 2018-09-19 DIAGNOSIS — M16.11: ICD-10-CM

## 2018-09-19 DIAGNOSIS — Z96.642: ICD-10-CM

## 2018-09-19 DIAGNOSIS — J44.9: ICD-10-CM

## 2018-09-19 DIAGNOSIS — Z91.09: ICD-10-CM

## 2018-09-19 LAB
ALBUMIN SERPL-MCNC: 3.5 G/DL (ref 3.4–5)
ALBUMIN/GLOB SERPL: 0.9 {RATIO} (ref 1–1.7)
ALP SERPL-CCNC: 136 U/L (ref 46–116)
ALT SERPL-CCNC: 20 U/L (ref 14–59)
ANION GAP SERPL CALC-SCNC: 9 MMOL/L (ref 6–14)
APTT BLD: 30 SEC (ref 24–38)
AST SERPL-CCNC: 19 U/L (ref 15–37)
BASOPHILS # BLD AUTO: 0 X10^3/UL (ref 0–0.2)
BASOPHILS NFR BLD: 1 % (ref 0–3)
BILIRUB SERPL-MCNC: 0.5 MG/DL (ref 0.2–1)
BUN SERPL-MCNC: 12 MG/DL (ref 7–20)
BUN/CREAT SERPL: 12 (ref 6–20)
CALCIUM SERPL-MCNC: 9.6 MG/DL (ref 8.5–10.1)
CHLORIDE SERPL-SCNC: 106 MMOL/L (ref 98–107)
CO2 SERPL-SCNC: 26 MMOL/L (ref 21–32)
CREAT SERPL-MCNC: 1 MG/DL (ref 0.6–1)
EOSINOPHIL NFR BLD: 0.1 X10^3/UL (ref 0–0.7)
EOSINOPHIL NFR BLD: 3 % (ref 0–3)
ERYTHROCYTE [DISTWIDTH] IN BLOOD BY AUTOMATED COUNT: 13.5 % (ref 11.5–14.5)
GFR SERPLBLD BASED ON 1.73 SQ M-ARVRAT: 55.6 ML/MIN
GLOBULIN SER-MCNC: 3.7 G/DL (ref 2.2–3.8)
GLUCOSE SERPL-MCNC: 93 MG/DL (ref 70–99)
HCT VFR BLD CALC: 37 % (ref 36–47)
HGB BLD-MCNC: 12.5 G/DL (ref 12–15.5)
LYMPHOCYTES # BLD: 1.6 X10^3/UL (ref 1–4.8)
LYMPHOCYTES NFR BLD AUTO: 36 % (ref 24–48)
MCH RBC QN AUTO: 30 PG (ref 25–35)
MCHC RBC AUTO-ENTMCNC: 34 G/DL (ref 31–37)
MCV RBC AUTO: 90 FL (ref 79–100)
MONO #: 0.4 X10^3/UL (ref 0–1.1)
MONOCYTES NFR BLD: 8 % (ref 0–9)
NEUT #: 2.2 X10^3UL (ref 1.8–7.7)
NEUTROPHILS NFR BLD AUTO: 52 % (ref 31–73)
PLATELET # BLD AUTO: 244 X10^3/UL (ref 140–400)
POTASSIUM SERPL-SCNC: 3.9 MMOL/L (ref 3.5–5.1)
PROT SERPL-MCNC: 7.2 G/DL (ref 6.4–8.2)
PROTHROMBIN TIME: 11.9 SEC (ref 11.7–14)
RBC # BLD AUTO: 4.12 X10^6/UL (ref 3.5–5.4)
SODIUM SERPL-SCNC: 141 MMOL/L (ref 136–145)
WBC # BLD AUTO: 4.3 X10^3/UL (ref 4–11)

## 2018-09-19 PROCEDURE — 85610 PROTHROMBIN TIME: CPT

## 2018-09-19 PROCEDURE — 85025 COMPLETE CBC W/AUTO DIFF WBC: CPT

## 2018-09-19 PROCEDURE — 80053 COMPREHEN METABOLIC PANEL: CPT

## 2018-09-19 PROCEDURE — 85730 THROMBOPLASTIN TIME PARTIAL: CPT

## 2018-09-19 PROCEDURE — 36415 COLL VENOUS BLD VENIPUNCTURE: CPT

## 2018-09-19 PROCEDURE — 85246 CLOT FACTOR VIII VW ANTIGEN: CPT

## 2018-09-21 LAB — VWF AG PPP IA-ACNC: (no result) [IU]/ML

## 2018-10-09 ENCOUNTER — HOSPITAL ENCOUNTER (OUTPATIENT)
Dept: HOSPITAL 61 - ECHO | Age: 63
Discharge: HOME | End: 2018-10-09
Attending: INTERNAL MEDICINE
Payer: COMMERCIAL

## 2018-10-09 DIAGNOSIS — J44.9: ICD-10-CM

## 2018-10-09 DIAGNOSIS — I10: ICD-10-CM

## 2018-10-09 DIAGNOSIS — R06.09: ICD-10-CM

## 2018-10-09 DIAGNOSIS — I82.813: Primary | ICD-10-CM

## 2018-10-09 DIAGNOSIS — Z87.891: ICD-10-CM

## 2018-10-09 PROCEDURE — A9500 TC99M SESTAMIBI: HCPCS

## 2018-10-09 PROCEDURE — 93017 CV STRESS TEST TRACING ONLY: CPT

## 2018-10-09 PROCEDURE — 93970 EXTREMITY STUDY: CPT

## 2018-10-09 PROCEDURE — 93306 TTE W/DOPPLER COMPLETE: CPT

## 2018-10-09 PROCEDURE — 96375 TX/PRO/DX INJ NEW DRUG ADDON: CPT

## 2018-10-09 PROCEDURE — 96374 THER/PROPH/DIAG INJ IV PUSH: CPT

## 2018-10-09 PROCEDURE — 96376 TX/PRO/DX INJ SAME DRUG ADON: CPT

## 2018-10-09 PROCEDURE — 78452 HT MUSCLE IMAGE SPECT MULT: CPT

## 2018-10-09 NOTE — RAD
MR#: [id number]

Account#: [id number]

Accession#: [id number]

Date of Study: 10/09/2018

Ordering Physician: RAY VELEZ 

Referring Physician: ANDREWS ALBRECHT Tech: Kirit Babin RT (R) (N)





--------------- APPROVED REPORT --------------





Test Type:          Pharmacological

Stress Nurse/Tech: KENRICK Garcia RN

Test Indications: swelling in lower legs

Cardiac History: asthma, COPD, HTN

Medications:     See Electronic Medical Record

Medical History: See Electronic Medical Record

Resting ECG:     SR

Resting Heart Rate: 69 bpm

Resting Blood Pressure: 129/71mmHg

Pretest Chest Pain: None



Nurse/Tech Notes

lungs CTA, S1S2

Consent: The procedure was explained to the patient in lay terms. Informed consent was witnessed. Venkatesh
eout was entered into [employer]. History and Stress Test performed by KENRICK Garcia RN



Pharm. Details

Pharmacologic stress testing was performed using Dobutamine with a maximal infusion of 40 mcg/mg/min.


  Atropine 0.5 mg, given intravenously for Other.



Stress Symptoms

dyspnea, no chest pain.  Dobutamine was titrated to 40mcg/kg/min and atropine 0.5 mg was given to taty
ch target heart rate. Zofran was given for nausea. BP dropped but patient recovered by termination of
 test



POST EXERCISE

Reason for Termination: Reached target heart rate

Target HR: 131

Max HR: 136 bpm

Max Blood Pressure: 181/64mmHg

Blood Pressure response to exercise: Abnormal blood pressure response during stress.

Heart Rate response to exercise: normal response

Chest Pain: No. 

Arrhythmia: No. 

ST Change: No. 



INTERPRETATION

Stress EKG Conclusion: Baseline EKG showed sinus rhythm.  No ischemic changes at peak stress.  No arr
hythmias.



Imaging Protocol

IMAGE PROTOCOL: Rest Tc-99m/stress Tc-99m 1 day



Rest:            Stress:         Viability:   

Radiopharm.Tc99m AmdvqenouKn20y Sestamibi

Dose12.1mCi            32.1mCi            

Duration    13min.           13min.           

Img Date  10/09/2018      10/09/2018      

Inj-Img Mocs44hbt.           60min.           



Rest Admin Site:IV - Left AntecubitalAdministrator:HIEN Kelsey

Stress Admin Site: IV - Left AntecubitalAdministrator: PETER KelseyMT



STRESS DATA

End Diast. Vol.71.0mlLVEDV index BSA34.0ml

End Syst. Vol.22.0mlLVESV index BSA11.0ml

Myocardial Pzvc797.0gEject. Yeezbkqc21.0%



Stress Scores

Regional WT1.00Summed WT3.00

Regional WM0.00Summed WM8.00



Study quality was good.  Left Ventricular size was Normal at Rest and Stress.

Lung uptake was .  Left Ventricular ejection fraction is 69%.

The rest and stress images show normal perfusion, normal contraction and thickening.



LV Perf. Quant

17 Seg. SSS0.00

17 Seg. SRS3.00

17 Seg. SDS0.00

Stress Defect Extent (% LAD)0.00Rest Defect Extent (% LAD)0.00Rev. Defect Extent (% LAD)0.00

Stress Defect Extent (% LCX) 0.00Rest Defect Extent (% LCX)8.80Rev. Defect Extent (% LCX)0.00

Stress Defect Extent (% RCA)0.00Rest Defect Extent (% RCA)0.00Rev. Defect Extent (% RCA)0.00

Stress Defect Extent (% SARMAD)0.00Rest Defect Extent (% SARMAD)2.00Rev. Defect Extent (% SARMAD)0.00



Conclusion

1. Dobutamine infusion cardioisotope stress test did not show any evidence of ischemia or infarct.

2. Normal left ventricular systolic function with ejection fraction calculated at 69%.

3. Low risk for cardiac events.



Signed by : Ray Velez, 

Electronically Approved : 10/09/2018 14:37:53

## 2018-10-09 NOTE — CARD
MR#: [id number]

Account#: [id number]

Accession#: [id number]

Date of Study: 10/09/2018

Ordering Physician: RAY VELEZ, 

Referring Physician: RAY VELEZ 

Tech: Rachel Miranda RDCS





--------------- APPROVED REPORT --------------





EXAM: Two-dimensional and M-mode echocardiogram with Doppler and color Doppler.



Other Information 

Quality : GoodHR: 61bpm

Rhythm : NSR



INDICATION

Hypertension/HCVD



2D DIMENSIONS 

RVDd2.9 (2.9-3.5cm)IVSd1.0 (0.7-1.1cm)

Aortic Root(2D)3.0 (2.0-3.7cm)LVDd4.6 (3.9-5.9cm)

LVOT Diameter1.9 (1.8-2.4cm)PWd1.0 (0.7-1.1cm)

LVDs2.9 (2.5-4.0cm)FS (%) 36.7 %

SV63.6 ml



M-Mode DIMENSIONS 

Left Atrium(MM)4.10 (2.5-4.0cm)Aortic Root2.83 (2.2-3.7cm)



Aortic Valve

AoV Peak Winston.151.6cm/sAoV VTI35.3cm

AO Peak GR.9.2mmHgLVOT Peak Winston.108.6cm/s

AO Mean GR.5mmHgAVA (VMAX)1.98cm2

DEMETRIA   (VTI)2.10cm2



Mitral Valve

MV E Vcgthgaq369.0cm/sMV E Peak Gr.5mmHg

MV DECEL [id number] A Jnmmwffo070.7cm/s

MV E Mean Gr.1mmHgE/A  Ratio1.1

MV A Tzbtfgkj667ov



Pulmonary Valve

PV Peak Yarsrvqw002.5cm/s



Pulmonary Vein

S1 Xjzkpcnn22.7cm/sD2 Tjjynzvl68.8cm/s

PVa bbguygqb477svbm



 LEFT VENTRICLE 

The left ventricle is normal size. There is normal left ventricular wall thickness. The left ventricu
lar systolic function is normal and the ejection fraction is within normal range. The Ejection Fracti
on is 55-60%. There is normal LV segmental wall motion. The left ventricular diastolic function and f
illing is normal for age.



 RIGHT VENTRICLE 

The right ventricle is normal size. There is normal right ventricular wall thickness. The right ventr
icular systolic function is normal.



 ATRIA 

The left atrium is borderline dilated. The right atrium size is normal. The interatrial septum is int
act with no evidence for an atrial septal defect or patent foramen ovale as noted on 2-D or Doppler i
maging.



 AORTIC VALVE 

The aortic valve is thickened but opens well. The aortic valve is trileaflet. Doppler and Color Flow 
revealed no significant aortic regurgitation. There is no significant aortic valvular stenosis.



 MITRAL VALVE 

The mitral valve is normal in structure and function. There is no evidence of mitral valve prolapse. 
There is no mitral valve stenosis. Doppler and Color-flow revealed trace mitral regurgitation.



 TRICUSPID VALVE 

The tricuspid valve is normal in structure and function. Doppler and Color Flow revealed no tricuspid
 valve regurgitation noted. There is no tricuspid valve prolapse or vegetation. There is no tricuspid
 valve stenosis.



 PULMONIC VALVE 

The pulmonary valve is normal in structure and function. Doppler and Color Flow revealed trace pulmon
ic valvular regurgitation. There is no pulmonic valvular stenosis.



 GREAT VESSELS 

The aortic root is normal in size. The ascending aorta is normal in size.



 PERICARDIAL EFFUSION 

There is no evidence of significant pericardial effusion.



Critical Notification

Critical Value: No



<Conclusion>

The left ventricle is normal size.

The left ventricular systolic function is normal and the ejection fraction is within normal range.

The Ejection Fraction is 55-60%.

There is no significant aortic valvular stenosis.

Doppler and Color Flow revealed no significant aortic regurgitation.

Doppler and Color-flow revealed trace mitral regurgitation.

Doppler and Color Flow revealed no tricuspid valve regurgitation noted.



Signed by : Franki Botello MD

Electronically Approved : 10/09/2018 08:43:40

## 2018-10-10 NOTE — RAD
MR#: [id number]

Account#: [id number]

Accession#: [id number]

Date of Study: 10/09/2018

Ordering Physician: RAY VELEZ, 

Referring Physician: RAY VELEZ, 

Tech: Phoenix Alcantara, ONI, RDMS, RVT, RDCS, RTR





--------------- APPROVED REPORT --------------





Patient Location : OUT-PATIENT



Indications

venous insufficiency



Findings

Grayscale images of the bilateral saphenofemoral junctions do not reveal any obvious evidence of thro
mbus. The bilateral greater saphenous veins are previously ablated. The bilateral lesser saphenous ve
ins do not show any evidence of reflux.



Critical Notification

Critical Value: No



<Conclusion>

Prior bilateral greater saphenous vein ablations.

No reflux in the bilateral lesser saphenous veins



Signed by : Rashi Garcia, 

Electronically Approved : 10/10/2018 08:24:08

## 2020-09-23 ENCOUNTER — HOSPITAL ENCOUNTER (OUTPATIENT)
Dept: HOSPITAL 61 - ECHO | Age: 65
Discharge: HOME | End: 2020-09-23
Attending: INTERNAL MEDICINE
Payer: MEDICARE

## 2020-09-23 DIAGNOSIS — I48.91: ICD-10-CM

## 2020-09-23 DIAGNOSIS — I35.1: Primary | ICD-10-CM

## 2020-09-23 PROCEDURE — A9500 TC99M SESTAMIBI: HCPCS

## 2020-09-23 PROCEDURE — 93017 CV STRESS TEST TRACING ONLY: CPT

## 2020-09-23 PROCEDURE — 78452 HT MUSCLE IMAGE SPECT MULT: CPT

## 2020-09-23 PROCEDURE — 93306 TTE W/DOPPLER COMPLETE: CPT

## 2020-09-23 NOTE — NUR
Called Dr. Blake to inform him that patient's heart rate would not increase to target 
heart rate of 138. Highest rate reached was 115 with Dobutamine infusion and 1mg of 
Atropine.  Report to be placed in the [employer] system for Dr. Blake to review.

## 2020-09-25 NOTE — RAD
MR#: [id number]

Account#: [id number]

Accession#: [id number]

Date of Study: 09/23/2020

Ordering Physician: RAY VELEZ,

Referring Physician: ANDREWS ALBRECHT Tech: RT Seth (YRN) (N)





--------------- APPROVED REPORT --------------





Test Type:          Pharmacological

Stress Nurse/Tech: Nela Chavez RN

Test Indications: A-fib

Cardiac History: Hypertension,a-fib

Medications:     See Electronic Medical Record

Medical History: See Electronic Medical Record

Resting ECG:     a-fib

Resting Heart Rate: 60 bpm

Resting Blood Pressure: 134/62mmHg

Pretest Chest Pain: No chest pain



Nurse/Tech Notes

Irregular, S1,S2 and lungs diminished in the bases. Patient states she is allergic to regadenosine (h
ad done previously at a cardiology office through ).

Consent: The procedure was explained to the patient in lay terms. Informed consent was witnessed. Venkatesh
eout was entered into [employer]. History and Stress Test performed by RT Seth (R) (N)



Stress Symptoms

Dizziness



POST EXERCISE

Reason for Termination: Infusion complete, could not reach target HR with Dobutamine and Atropine


Target HR: No

Max HR: 115 bpm

75% of Maximum Predicted HR: 153 bpm

Max Blood Pressure: 155/51mmHg

Blood Pressure response to exercise: Normal blood pressure response during stress.

Heart Rate response to exercise: WNL

Chest Pain: No. 

Arrhythmia: No. 

ST Change: No. 



INTERPRETATION

Stress EKG Conclusion: The resting EKG shows a sinus rhythm, incomplete right bundle branch and nonsp
ecific ST-T wave changes.

The stress EKG shows no significant changes from baseline.

No EKG evidence of stress-induced ischemia.



Imaging Protocol

IMAGE PROTOCOL: Rest Tc-99m/stress Tc-99m 1 day



Rest:            Stress:         Viability:   

Radiopharm.Tc99m YswmwevczPh97q Sestamibi

Dose10.4mCi            31mCi            

Duration    13min.           13min.           

Img Date  09/23/2020 09/23/2020      

Inj-Img [id number].           60min.           



Rest Admin Site::[facility] (R)(N)

Stress Admin Site: : [facility], ARRT (R)(N)



STRESS DATA

End Diast. Vol.74.0mlLVEDV index BSA34.0ml

End Syst. Vol.22.0mlLVESV index BSA10.0ml

Myocardial Dgqw689.0gEject. Jaiufzid87.0%



Stress Scores

Regional WT1.00Summed WT2.00

Regional WM0.00Summed WM5.00



LV Perfusion

The stress scans show mild apical thinning.

The rest scans showed mild apical thinning.

Nuclear imaging shows no reversible ischemia.

There is an area of mild fixed apical thinning most consistent with a technical artifact.



Wall Motion

Left ventricular systolic function is normal with no regional wall motion abnormalities and an ejecti
on fraction of greater than 70%.



LV Perf. Quant

17 Seg. SSS3.00

17 Seg. SRS0.00

17 Seg. SDS3.00

Stress Defect Extent (% LAD)1.90Rest Defect Extent (% LAD)0.00Rev. Defect Extent (% LAD)1.90

Stress Defect Extent (% LCX) 12.50Rest Defect Extent (% LCX)0.00Rev. Defect Extent (% LCX)2.50

Stress Defect Extent (% RCA)0.00Rest Defect Extent (% RCA)0.00Rev. Defect Extent (% RCA)0.00

Stress Defect Extent (% SARMDA)5.00Rest Defect Extent (% SARMAD)0.00Rev. Defect Extent (% SARMAD)3.30



Conclusion

1. No EKG evidence of stress-induced ischemia.

2. Nuclear imaging shows no reversible ischemia.

3. Nuclear imaging shows mild fixed apical thinning most consistent with a technical artifact.

4. Normal left ventricular systolic function with no regional wall motion abnormalities and an ejecti
on fraction of greater than 70%.

5. Low risk Lexiscan nuclear stress test.



Signed by : Franki Botello MD

Electronically Approved : 09/25/2020 10:43:01

## 2020-10-06 ENCOUNTER — HOSPITAL ENCOUNTER (OUTPATIENT)
Dept: HOSPITAL 61 - LAB | Age: 65
End: 2020-10-06
Attending: INTERNAL MEDICINE
Payer: MEDICARE

## 2020-10-06 DIAGNOSIS — Z01.812: Primary | ICD-10-CM

## 2020-10-06 DIAGNOSIS — K21.9: ICD-10-CM

## 2020-10-06 DIAGNOSIS — Z20.828: ICD-10-CM

## 2020-10-12 ENCOUNTER — HOSPITAL ENCOUNTER (OUTPATIENT)
Dept: HOSPITAL 61 - LAB | Age: 65
End: 2020-10-12
Attending: INTERNAL MEDICINE
Payer: MEDICARE

## 2020-10-12 DIAGNOSIS — Z20.828: ICD-10-CM

## 2020-10-12 DIAGNOSIS — K21.9: ICD-10-CM

## 2020-10-12 DIAGNOSIS — Z86.010: ICD-10-CM

## 2020-10-12 DIAGNOSIS — Z01.812: Primary | ICD-10-CM

## 2020-10-26 ENCOUNTER — HOSPITAL ENCOUNTER (EMERGENCY)
Dept: HOSPITAL 61 - ER | Age: 65
Discharge: HOME | End: 2020-10-26
Payer: MEDICARE

## 2020-10-26 VITALS
DIASTOLIC BLOOD PRESSURE: 80 MMHG | DIASTOLIC BLOOD PRESSURE: 80 MMHG | SYSTOLIC BLOOD PRESSURE: 142 MMHG | SYSTOLIC BLOOD PRESSURE: 142 MMHG

## 2020-10-26 VITALS — HEIGHT: 68 IN | WEIGHT: 229.28 LBS | BODY MASS INDEX: 34.75 KG/M2

## 2020-10-26 DIAGNOSIS — Z79.899: ICD-10-CM

## 2020-10-26 DIAGNOSIS — Z90.49: ICD-10-CM

## 2020-10-26 DIAGNOSIS — F32.9: ICD-10-CM

## 2020-10-26 DIAGNOSIS — R10.84: ICD-10-CM

## 2020-10-26 DIAGNOSIS — J44.9: ICD-10-CM

## 2020-10-26 DIAGNOSIS — Z88.8: ICD-10-CM

## 2020-10-26 DIAGNOSIS — Z87.891: ICD-10-CM

## 2020-10-26 DIAGNOSIS — E78.00: ICD-10-CM

## 2020-10-26 DIAGNOSIS — F41.9: ICD-10-CM

## 2020-10-26 DIAGNOSIS — K59.00: Primary | ICD-10-CM

## 2020-10-26 DIAGNOSIS — K21.9: ICD-10-CM

## 2020-10-26 DIAGNOSIS — I10: ICD-10-CM

## 2020-10-26 DIAGNOSIS — Z90.710: ICD-10-CM

## 2020-10-26 LAB
AMPHETAMINE/METHAMPHETAMINE: (no result)
APTT PPP: YELLOW S
BACTERIA #/AREA URNS HPF: 0 /HPF
BARBITURATES UR-MCNC: (no result) UG/ML
BENZODIAZ UR-MCNC: (no result) UG/L
BILIRUB UR QL STRIP: NEGATIVE
CANNABINOIDS UR-MCNC: (no result) UG/L
COCAINE UR-MCNC: (no result) NG/ML
FIBRINOGEN PPP-MCNC: CLEAR MG/DL
HYALINE CASTS #/AREA URNS LPF: (no result) /HPF
METHADONE SERPL-MCNC: (no result) NG/ML
NITRITE UR QL STRIP: NEGATIVE
OPIATES UR-MCNC: (no result) NG/ML
PCP SERPL-MCNC: (no result) MG/DL
PH UR STRIP: 5.5 [PH]
PROT UR STRIP-MCNC: NEGATIVE MG/DL
RBC #/AREA URNS HPF: 0 /HPF (ref 0–2)
UROBILINOGEN UR-MCNC: 0.2 MG/DL
WBC #/AREA URNS HPF: (no result) /HPF (ref 0–4)

## 2020-10-26 PROCEDURE — 87086 URINE CULTURE/COLONY COUNT: CPT

## 2020-10-26 PROCEDURE — 81001 URINALYSIS AUTO W/SCOPE: CPT

## 2020-10-26 PROCEDURE — 99284 EMERGENCY DEPT VISIT MOD MDM: CPT

## 2020-10-26 PROCEDURE — 87186 SC STD MICRODIL/AGAR DIL: CPT

## 2020-10-26 PROCEDURE — 87077 CULTURE AEROBIC IDENTIFY: CPT

## 2020-10-26 PROCEDURE — 80307 DRUG TEST PRSMV CHEM ANLYZR: CPT

## 2020-10-26 PROCEDURE — 74176 CT ABD & PELVIS W/O CONTRAST: CPT

## 2020-10-26 PROCEDURE — 96372 THER/PROPH/DIAG INJ SC/IM: CPT

## 2020-10-26 NOTE — RAD
Study: CT abdomen/pelvis without intravenous contrast

 

Indication: Abdominal pain.

 

Comparison: CT abdomen/pelvis 5/6/2016.

 

Technique: Helical CT imaging performed of the abdomen and pelvis without 

the use of intravenous contrast. Sagittal and coronal reformats were 

obtained. 

 

One or more of the following individualized dose reduction techniques were

utilized for this examination:  

 

1. Automated exposure control

2. Adjustment of the mA and/or kV according to patient size

3. Use of iterative reconstruction technique.

 

Findings:

 

Inherently limited evaluation without intravenous contrast.

 

Mild/moderate degree of constipation. Fecalization of enteric contents 

within the distal small bowel suggesting slowed transit. Limited ability 

to detect pathology arising from the mucosal surfaces of the 

gastrointestinal tract without oral contrast. No pathologic dilatation of 

the small bowel. Unremarkable stomach with the exception of a small hiatal

hernia.

 

Mild basilar volume loss. No newly seen hepatic parenchymal abnormality. 

Surgically absent gallbladder. Unremarkable pancreas, spleen and left 

adrenal gland. A benign right adrenal gland mass is no different from 

2016. No dedicated follow-up is needed given stability over time. Left 

renal cyst, image 20 series 2, faintly present on the prior. No collecting

system obstruction or stone. Unremarkable urinary bladder noting partially

limited evaluation due to streak artifact from a left total hip 

arthroplasty. Surgically absent uterus. Unremarkable adnexa.

 

Aortobiiliac calcific atherosclerosis. No aneurysmal dilatation. No 

lymphadenopathy by size criteria. No free fluid or pneumoperitoneum. No 

complex hernia.

 

No acute or aggressive osseous process. Redemonstrated vertebral body 

fusion across T10-T11. The partially imaged left hip arthroplasty 

construct is intact and without loosening. Moderate right hip arthrosis 

with axial joint space narrowing is relatively similar in severity to the 

2016 exam.

 

Impression:

 

1.  No acute abnormality is identified throughout the abdomen or pelvis. 

The only finding that could potentially explain the patient's abdominal 

pain is a mild to moderate degree of constipation.

2.  Chronic findings, as detailed in the body the report, most of which 

were present in 2016 and have not significantly changed.

 

Electronically signed by: HAFSA GOVEA MD (10/26/2020 9:59 PM) [id number]

## 2020-10-26 NOTE — PHYS DOC
Past Medical History


Past Medical History:  Anxiety, Asthma, COPD, Depression, GERD, High [place]

sterol, Hypertension, Other


Additional Past Medical Histor:  TREMOR


Past Surgical History:  Cholecystectomy, Hysterectomy, Other


Smoking Status:  Former Smoker


Alcohol Use:  None


Drug Use:  None





General Adult


EDM:


Chief Complaint:  ABDOMINAL PAIN





HPI:


HPI:





Patient is a 65  year old female with history of depression, hypertension, high 

cholesterol, who presents to the ED today complaining of generalized upper 

abdominal pain that has been going on for 3 weeks.  Patient states sometimes she

feels like she has no appetite.  She states she only has 1 bowel movement every 

week.  Denies any vomiting but states she has been nauseated.  She states her 

last bowel movement was 4 days ago.  Denies anything specifically relieving the 

pain but states eating food sometimes makes the pain worse.





Review of Systems:


Review of Systems:


Constitutional:   Denies fever or chills. []


Eyes:   Denies change in visual acuity. []


HENT:   Denies nasal congestion or sore throat. [] 


Respiratory:   Denies cough or shortness of breath. [] 


Cardiovascular:   Denies chest pain or edema. [] 


GI:   Reports abdominal pain, denies nausea, vomiting, bloody stools or 

diarrhea. [] 


:  Denies dysuria. [] 


Musculoskeletal:   Denies back pain or joint pain. [] 


Integument:   Denies rash. [] 


Neurologic:   Denies headache, focal weakness or sensory changes. [] 


Psychiatric:  Denies depression or anxiety. []





Heart Score:


Risk Factors:


Risk Factors:  DM, Current or recent (<one month) smoker, HTN, HLP, family 

history of CAD, obesity.


Risk Scores:


Score 0 - 3:  2.5% MACE over next 6 weeks - Discharge Home


Score 4 - 6:  20.3% MACE over next 6 weeks - Admit for Clinical Observation


Score 7 - 10:  72.7% MACE over next 6 weeks - Early Invasive Strategies





Current Medications:





Current Medications








 Medications


  (Trade)  Dose


 Ordered  Sig/Radha  Start Time


 Stop Time Status Last Admin


Dose Admin


 


 Famotidine


  (Pepcid Vial)  20 mg  1X  ONCE  10/26/20 22:00


 10/26/20 22:01 DC  





 


 Metoclopramide HCl


  (Reglan Vial)  10 mg  1X  ONCE  10/26/20 22:00


 10/26/20 22:01 DC  





 


 Morphine Sulfate


  (Morphine


 Sulfate)  5 mg  1X  ONCE  10/26/20 22:00


 10/26/20 22:01 DC  














Allergies:


Allergies:





Allergies








Coded Allergies Type Severity Reaction Last Updated Verified


 


  regadenoson Allergy Severe LIPS SWELLED AND HIVES ON CHEST AND ARMS 3/30/17 

Yes


 


  I S O L A T I O N *CONTACT* Allergy Unknown  3/30/17 Yes











Physical Exam:


PE:





Constitutional: Well developed, well nourished, no acute distress, non-toxic 

appearance. []


HENT: Normocephalic, atraumatic, bilateral external ears normal, oropharynx 

moist, no oral exudates, nose normal. []


Eyes: PERRLA, EOMI, conjunctiva normal, no discharge. [] 


Neck: Normal range of motion, no tenderness, supple, no stridor. [] 


Cardiovascular:Heart rate regular rhythm, no murmur []


Lungs & Thorax:  Bilateral breath sounds clear to auscultation []


Abdomen: Rounded distended abdomen.  Bowel sounds normal, soft, no tenderness, 

no masses, no pulsatile masses. [] 


Skin: Warm, dry, no erythema, no rash. [] 


Back: No tenderness, no CVA tenderness. [] 


Extremities: No tenderness, no cyanosis, no clubbing, ROM intact, no edema. [] 


Neurologic: Alert and oriented X 3, normal motor function, normal sensory functi

on, no focal deficits noted. []


Psychologic: Affect normal, judgement normal, mood normal. []





Current Patient Data:


Labs:





                                Laboratory Tests








Test


 10/26/20


21:30


 


Urine Collection Type Unknown  


 


Urine Color Yellow  


 


Urine Clarity Clear  


 


Urine pH


 5.5 (<5.0-8.0)





 


Urine Specific Gravity


 1.010


(1.000-1.030)


 


Urine Protein


 Negative mg/dL


(NEG-TRACE)


 


Urine Glucose (UA)


 Negative mg/dL


(NEG)


 


Urine Ketones (Stick)


 Negative mg/dL


(NEG)


 


Urine Blood Trace (NEG)  


 


Urine Nitrite


 Negative (NEG)





 


Urine Bilirubin


 Negative (NEG)





 


Urine Urobilinogen Dipstick


 0.2 mg/dL (0.2


mg/dL)


 


Urine Leukocyte Esterase Trace (NEG)  


 


Urine RBC 0 /HPF (0-2)  


 


Urine WBC


 5-10 /HPF


(0-4)


 


Urine Squamous Epithelial


Cells Many /LPF  





 


Urine Transitional Epithelial


Cells Few /LPF  





 


Urine Bacteria


 0 /HPF (0-FEW)





 


Urine Hyaline Casts Moderate /HPF  


 


Urine Mucus Slight /LPF  


 


Urine Opiates Screen Neg (NEG)  


 


Urine Methadone Screen Neg (NEG)  


 


Urine Barbiturates Neg (NEG)  


 


Urine Phencyclidine Screen Neg (NEG)  


 


Urine


Amphetamine/Methamphetamine Neg (NEG)  





 


Urine Benzodiazepines Screen Neg (NEG)  


 


Urine Cocaine Screen Neg (NEG)  


 


Urine Cannabinoids Screen Neg (NEG)  


 


Urine Ethyl Alcohol Neg (NEG)  








Vital Signs:





                                   Vital Signs








  Date Time  Temp Pulse Resp B/P (MAP) Pulse Ox O2 Delivery O2 Flow Rate FiO2


 


10/26/20 19:40 98.6 64 18 142/80 (100) 98 Room Air  





 98.6       











EKG:


EKG:


[]





Radiology/Procedures:


Radiology/Procedures:


[]PROCEDURE: CT ABDOMEN PELVIS WO CONTRAST





Study: CT abdomen/pelvis without intravenous contrast


 


Indication: Abdominal pain.


 


Comparison: CT abdomen/pelvis 5/6/2016.


 


Technique: Helical CT imaging performed of the abdomen and pelvis without 


the use of intravenous contrast. Sagittal and coronal reformats were 


obtained. 


 


One or more of the following individualized dose reduction techniques were


utilized for this examination:  


 


1. Automated exposure control


2. Adjustment of the mA and/or kV according to patient size


3. Use of iterative reconstruction technique.


 


Findings:


 


Inherently limited evaluation without intravenous contrast.


 


Mild/moderate degree of constipation. Fecalization of enteric contents 


within the distal small bowel suggesting slowed transit. Limited ability 


to detect pathology arising from the mucosal surfaces of the 


gastrointestinal tract without oral contrast. No pathologic dilatation of 


the small bowel. Unremarkable stomach with the exception of a small hiatal


hernia.


 


Mild basilar volume loss. No newly seen hepatic parenchymal abnormality. 


Surgically absent gallbladder. Unremarkable pancreas, spleen and left 


adrenal gland. A benign right adrenal gland mass is no different from 


2016. No dedicated follow-up is needed given stability over time. Left 


renal cyst, image 20 series 2, faintly present on the prior. No collecting


system obstruction or stone. Unremarkable urinary bladder noting partially


limited evaluation due to streak artifact from a left total hip 


arthroplasty. Surgically absent uterus. Unremarkable adnexa.


 


Aortobiiliac calcific atherosclerosis. No aneurysmal dilatation. No 


lymphadenopathy by size criteria. No free fluid or pneumoperitoneum. No 


complex hernia.


 


No acute or aggressive osseous process. Redemonstrated vertebral body 


fusion across T10-T11. The partially imaged left hip arthroplasty 


construct is intact and without loosening. Moderate right hip arthrosis 


with axial joint space narrowing is relatively similar in severity to the 


2016 exam.


 


Impression:


 


1.  No acute abnormality is identified throughout the abdomen or pelvis. 


The only finding that could potentially explain the patient's abdominal 


pain is a mild to moderate degree of constipation.


2.  Chronic findings, as detailed in the body the report, most of which 


were present in 2016 and have not significantly changed.


 


Electronically signed by: HAFSA GOVEA MD (10/26/2020 9:59 PM) [id number]














DICTATED and SIGNED BY:     HAFSA GOVEA MD


DATE:     10/26/20 2159





Course & Med Decision Making:


Course & Med Decision Making


Pertinent Labs and Imaging studies reviewed. (See chart for details)





This is a 65-year-old female patient presented to the ED today complaining of 

upper abdominal pain, CT scan of the abdomen and pelvic noted for moderate 

constipation.  Educated this patient on managing and preventing constipation.  

Discharge to home with medicines to help manage constipation.





Dragon Disclaimer:


Dragon Disclaimer:


This electronic medical record was generated, in whole or in part, using a voice

 recognition dictation system.





Departure


Departure


Impression:  


   Primary Impression:  


   Constipation


   Qualified Codes:  K59.00 - Constipation, unspecified


Disposition:  01 DC HOME SELF CARE/HOMELESS


Condition:  STABLE


Referrals:  


XAVIER FUNEZ MD (PCP)


follow up with your doctor in 1 week


Patient Instructions:  Constipation, Adult





Additional Instructions:  


Your CAT scan was noted for constipation.  Please take the prescribed 

medications as ordered, try to exercise, try to increase your dietary fiber 

intake as well as your water intake.  Follow-up with your doctor in 1 to 2 

weeks.


Scripts


Polyethylene Glycol 3350 (MIRALAX) 119 Gm Powder


17 GM PO DAILY for constipation, #255 GM 0 Refills


   dissolve in water


   Prov: ALBINO WONG APRN         10/26/20 


Magnesium Citrate (MAGNESIUM CITRATE) 296 Ml Solution


296 ML PO ONCE, #296 ML


   Prov: ALBINO WONG APRN         10/26/20











ALBINO WONG              Oct 26, 2020 22:19

## 2021-02-24 ENCOUNTER — HOSPITAL ENCOUNTER (OUTPATIENT)
Dept: HOSPITAL 61 - LAB | Age: 66
End: 2021-02-24
Attending: INTERNAL MEDICINE
Payer: MEDICARE

## 2021-02-24 DIAGNOSIS — I48.0: ICD-10-CM

## 2021-02-24 DIAGNOSIS — Z01.812: Primary | ICD-10-CM

## 2021-02-24 DIAGNOSIS — Z20.822: ICD-10-CM

## 2021-02-24 DIAGNOSIS — I49.5: ICD-10-CM

## 2021-02-24 DIAGNOSIS — J44.9: ICD-10-CM

## 2021-02-24 PROCEDURE — U0003 INFECTIOUS AGENT DETECTION BY NUCLEIC ACID (DNA OR RNA); SEVERE ACUTE RESPIRATORY SYNDROME CORONAVIRUS 2 (SARS-COV-2) (CORONAVIRUS DISEASE [COVID-19]), AMPLIFIED PROBE TECHNIQUE, MAKING USE OF HIGH THROUGHPUT TECHNOLOGIES AS DESCRIBED BY CMS-2020-01-R: HCPCS

## 2021-02-26 ENCOUNTER — HOSPITAL ENCOUNTER (OUTPATIENT)
Dept: HOSPITAL 61 - CCL | Age: 66
Setting detail: OBSERVATION
LOS: 1 days | Discharge: HOME | End: 2021-02-27
Attending: INTERNAL MEDICINE | Admitting: INTERNAL MEDICINE
Payer: MEDICARE

## 2021-02-26 VITALS — DIASTOLIC BLOOD PRESSURE: 82 MMHG | SYSTOLIC BLOOD PRESSURE: 148 MMHG

## 2021-02-26 VITALS — DIASTOLIC BLOOD PRESSURE: 61 MMHG | SYSTOLIC BLOOD PRESSURE: 121 MMHG

## 2021-02-26 VITALS — SYSTOLIC BLOOD PRESSURE: 138 MMHG | DIASTOLIC BLOOD PRESSURE: 51 MMHG

## 2021-02-26 VITALS — SYSTOLIC BLOOD PRESSURE: 149 MMHG | DIASTOLIC BLOOD PRESSURE: 53 MMHG

## 2021-02-26 VITALS — WEIGHT: 222.23 LBS | BODY MASS INDEX: 33.68 KG/M2 | HEIGHT: 68 IN

## 2021-02-26 VITALS — SYSTOLIC BLOOD PRESSURE: 112 MMHG | DIASTOLIC BLOOD PRESSURE: 43 MMHG

## 2021-02-26 VITALS — SYSTOLIC BLOOD PRESSURE: 134 MMHG | DIASTOLIC BLOOD PRESSURE: 53 MMHG

## 2021-02-26 DIAGNOSIS — I48.0: ICD-10-CM

## 2021-02-26 DIAGNOSIS — I49.5: Primary | ICD-10-CM

## 2021-02-26 DIAGNOSIS — I10: ICD-10-CM

## 2021-02-26 DIAGNOSIS — E78.5: ICD-10-CM

## 2021-02-26 LAB
ANION GAP SERPL CALC-SCNC: 14 MMOL/L (ref 6–14)
BUN SERPL-MCNC: 19 MG/DL (ref 7–20)
CALCIUM SERPL-MCNC: 9.5 MG/DL (ref 8.5–10.1)
CHLORIDE SERPL-SCNC: 104 MMOL/L (ref 98–107)
CO2 SERPL-SCNC: 25 MMOL/L (ref 21–32)
CREAT SERPL-MCNC: 1.2 MG/DL (ref 0.6–1)
ERYTHROCYTE [DISTWIDTH] IN BLOOD BY AUTOMATED COUNT: 15.1 % (ref 11.5–14.5)
GFR SERPLBLD BASED ON 1.73 SQ M-ARVRAT: 44.7 ML/MIN
GLUCOSE SERPL-MCNC: 117 MG/DL (ref 70–99)
HCT VFR BLD CALC: 32.7 % (ref 36–47)
HGB BLD-MCNC: 10.7 G/DL (ref 12–15.5)
MCH RBC QN AUTO: 28 PG (ref 25–35)
MCHC RBC AUTO-ENTMCNC: 33 G/DL (ref 31–37)
MCV RBC AUTO: 84 FL (ref 79–100)
PLATELET # BLD AUTO: 292 X10^3/UL (ref 140–400)
POTASSIUM SERPL-SCNC: 2.9 MMOL/L (ref 3.5–5.1)
PROTHROMBIN TIME: 16.2 SEC (ref 11.7–14)
RBC # BLD AUTO: 3.88 X10^6/UL (ref 3.5–5.4)
SODIUM SERPL-SCNC: 143 MMOL/L (ref 136–145)
WBC # BLD AUTO: 5.1 X10^3/UL (ref 4–11)

## 2021-02-26 PROCEDURE — 80048 BASIC METABOLIC PNL TOTAL CA: CPT

## 2021-02-26 PROCEDURE — G0378 HOSPITAL OBSERVATION PER HR: HCPCS

## 2021-02-26 PROCEDURE — 96366 THER/PROPH/DIAG IV INF ADDON: CPT

## 2021-02-26 PROCEDURE — C1769 GUIDE WIRE: HCPCS

## 2021-02-26 PROCEDURE — 96365 THER/PROPH/DIAG IV INF INIT: CPT

## 2021-02-26 PROCEDURE — 83735 ASSAY OF MAGNESIUM: CPT

## 2021-02-26 PROCEDURE — 99153 MOD SED SAME PHYS/QHP EA: CPT

## 2021-02-26 PROCEDURE — 71046 X-RAY EXAM CHEST 2 VIEWS: CPT

## 2021-02-26 PROCEDURE — C1785 PMKR, DUAL, RATE-RESP: HCPCS

## 2021-02-26 PROCEDURE — G0379 DIRECT REFER HOSPITAL OBSERV: HCPCS

## 2021-02-26 PROCEDURE — 33208 INSRT HEART PM ATRIAL & VENT: CPT

## 2021-02-26 PROCEDURE — 36415 COLL VENOUS BLD VENIPUNCTURE: CPT

## 2021-02-26 PROCEDURE — 85610 PROTHROMBIN TIME: CPT

## 2021-02-26 PROCEDURE — 99152 MOD SED SAME PHYS/QHP 5/>YRS: CPT

## 2021-02-26 PROCEDURE — 84132 ASSAY OF SERUM POTASSIUM: CPT

## 2021-02-26 PROCEDURE — C1773 RET DEV, INSERTABLE: HCPCS

## 2021-02-26 PROCEDURE — 85027 COMPLETE CBC AUTOMATED: CPT

## 2021-02-26 PROCEDURE — 93451 RIGHT HEART CATH: CPT

## 2021-02-26 PROCEDURE — 71045 X-RAY EXAM CHEST 1 VIEW: CPT

## 2021-02-26 RX ADMIN — OXYCODONE HYDROCHLORIDE AND ACETAMINOPHEN PRN TAB: 5; 325 TABLET ORAL at 20:30

## 2021-02-26 RX ADMIN — OXYCODONE HYDROCHLORIDE AND ACETAMINOPHEN PRN TAB: 5; 325 TABLET ORAL at 14:30

## 2021-02-26 NOTE — CARD
MR#: [id number]

Account#: [id number]

Accession#: [id number]

Date of Study: 02/26/2021

Ordering Physician: RAY BLAKE, 

Referring Physician: RAY BLAKE, 

Tech: 





--------------- APPROVED REPORT --------------





PROCEDURES

1.  Implantation of Biotronik dual-chamber permanent pacemaker

2.  Right heart catheterization



Sedation Time: 79 Minutes

Dose: 11.23 Gycm2

Fluoro Time: 5.7 Minutes



INDICATIONS

Symptomatic tachycardia-bradycardia/sick sinus syndrome and refractory dyspnea on minimal exertion



IV conscious sedation was used throughout procedure with appropriate monitoring and was performed in 
the presence of a registered nurse who was an independent trained observer other than the physician p
erforming the procedure.

Specimen(s) Removed: No

Estimated Blood loss: 20 cc's.



PROCEDURAL DETAILS



After explaining the risk, benefits and alternative options, informed consent was obtained from patie
nt.  Patient was brought to the cardiac Cath Lab and her left chest and shoulder were prepped and reji
ped in the usual fashion.  30 cc of 2% lidocaine was infiltrated into the skin and subcutaneous tissu
es for local anesthesia.  An incision was made over the left infraclavicular fossa and using blunt di
ssection and cautery, a pocket was created.  Venous access was obtained in the left subclavian vein a
nd 8 [place] sheath inserted.  A 7.5 [place] catheter was then advanced under fluoroscopic gu
idance and intracardiac pressures and oxygen saturations were measured.  Cardiac output was determine
d by Katharine method.  



RIGHT HEART CATHETERIZATION FINDINGS

a.  Intracardiac pressures: Mean right atrial pressure 5 mmHg, right ventricular pressure 44/0 mmHg, 
pulmonary artery 38/15 mmHg with mean PA pressure 25 mmHg, mean pulmonary capillary wedge pressure 13
 mmHg consistent with mild pulmonary hypertension.

b.  Oxygen saturations: Right atrium 76.8%, pulmonary artery 75.6%.  No evidence of intracardiac shun
t.

c.  Cardiac output by Katharine method 6.6 L/min.



Subsequently, a Biotronik bipolar active fixation right ventricular lead model Solia, serial #[id number]
[id number] was advanced under fluoroscopic guidance and the tip was positioned in the right ventricular apex
.  Following this, a Biotronik bipolar active fixation right atrial lead model Solia, serial #[id number]
[id number] was positioned in the right atrial appendage under fluoroscopic guidance.  The leads were secured
 into place and were attached to a Emblyronik dual-chamber permanent pacemaker generator, model Edora 
8 DR-T, serial #[id number].  This was placed in the pocket that was subsequently closed in 3 layers.  H
emostasis was secured.



The right ventricular lead showed a sensing amplitude of 10 mV, impedance of 690 ohms and a threshold
 of 0.4 V.  The right atrial lead showed a sensing amplitude of 3.0 mV, impedance of 810 ohms and a t
hreshold of 0.7 V.  Patient tolerated the procedure well.  There were no immediate complications.



CONCLUSION

Successful implantation of Biotronik dual-chamber permanent pacemaker for symptomatic tachycardia-bra
dycardia/sick sinus syndrome.

Mild pulmonary hypertension on right heart catheterization.  No evidence of intracardiac shunt.



Signed by : Ray Blake, 

Electronically Approved : 02/26/2021 10:25:10

## 2021-02-26 NOTE — NUR
Patient's K 2.9. Notified Dr. Blake at 0752. Order received to give 40 MEQ K PO and 10 
MEQ K IV x1 now. Will not delay case. See labs, orders.

## 2021-02-26 NOTE — RAD
XR CHEST 1V



History: Reason: Post pacemaker / Spl. Instructions:  / History: 



Comparison: Chest x-ray 4/9/2018



Technique: Portable AP chest radiograph.



Findings:

Tubes/lines: New left chest dual-chamber pacemaker with lead tips projecting at the right atrium and 
right ventricle.

Lungs: Mild right greater than left basilar atelectasis.

Pleural Spaces: No effusion or pneumothorax.

Cardiac Silhouette: Prominent cardiac silhouette with calcified aorta

Pulmonary Vasculature: Within normal limits.

Osseous Structures and Other: Degenerative changes of the spine. Surgical clips project over the righ
t upper lung.



Impression: 

1.  New left chest dual-chamber pacemaker. No pneumothorax.



Electronically signed by: Jorge Ewing MD (2/26/2021 11:41 AM) [facility]

## 2021-02-27 VITALS — DIASTOLIC BLOOD PRESSURE: 51 MMHG | SYSTOLIC BLOOD PRESSURE: 133 MMHG

## 2021-02-27 VITALS — SYSTOLIC BLOOD PRESSURE: 139 MMHG | DIASTOLIC BLOOD PRESSURE: 43 MMHG

## 2021-02-27 VITALS — DIASTOLIC BLOOD PRESSURE: 43 MMHG | SYSTOLIC BLOOD PRESSURE: 139 MMHG

## 2021-02-27 LAB
MAGNESIUM SERPL-MCNC: 2.1 MG/DL (ref 1.8–2.4)
POTASSIUM SERPL-SCNC: 3.9 MMOL/L (ref 3.5–5.1)

## 2021-02-27 RX ADMIN — OXYCODONE HYDROCHLORIDE AND ACETAMINOPHEN PRN TAB: 5; 325 TABLET ORAL at 10:16

## 2021-02-27 NOTE — NUR
Discharge Note:



CELESTINO GARCIA [place]



Discharge instructions and discharge home medications reviewed with Patient and a copy 
given. All questions have been answered and understanding verbalized. 



The following instructions and handouts were given: propranolol, post pacemaker



Patient discharged to home with self care via wheelchair.

## 2021-02-27 NOTE — RAD
XR CHEST 2V 



INDICATION: 1 day post pacemaker implantation 



COMPARISON STUDY: 2/26/2021.



FINDINGS: 



Stable left pectoral pacemaker.



Lungs: Normal lung volume. No pulmonary mass or consolidation. Stable metallic clips overlying the ri
ght upper lung zone. The tracheobronchial tree and hilar structures are normal.



Pleura: No pleural effusion or pneumothorax.



Heart and Mediastinum: The cardiomediastinal silhouette is normal. The great vessels of the thorax ar
e normal.



IMPRESSION:  

No acute cardiopulmonary process. No pneumothorax.



Electronically signed by: Parveen Bills MD (2/27/2021 10:11 AM) [id number]

## 2021-02-27 NOTE — PDOC3
Discharge Summary


Visit Information


Date of Admission:  Feb 26, 2021


Date of Discharge:  Feb 27, 2021


Admitting Diagnosis:  Sick sinus syndrome


Final Diagnosis


Sick sinus syndrome


Tachy-tammi syndrome


Paroxysmal atrial fibrillation


HTN


HLP





Brief Hospital Course


Allergies





                                    Allergies








Coded Allergies Type Severity Reaction Last Updated Verified


 


  diltiazem Allergy Severe severe bradycardia w/syncope 2/26/21 Yes


 


  regadenoson Allergy Severe LIPS SWELLED AND HIVES ON CHEST AND ARMS 3/30/17 

Yes


 


  citalopram Allergy Intermediate hives 2/26/21 Yes


 


  Latex, Natural Rubber Allergy Mild hives 2/26/21 Yes


 


  I S O L A T I O N *CONTACT* Allergy Unknown  3/30/17 Yes








Vital Signs





Vital Signs








  Date Time  Temp Pulse Resp B/P (MAP) Pulse Ox O2 Delivery O2 Flow Rate FiO2


 


2/27/21 08:20  71  139/43    


 


2/27/21 08:00      Room Air  


 


2/27/21 07:30 98.7  16  96   





 98.7       


 


2/26/21 14:30       2.0 








Lab Results





Laboratory Tests








Test


 2/26/21


07:30 2/26/21


12:10 2/27/21


06:49


 


White Blood Count


 5.1 x10^3/uL


(4.0-11.0) 


 





 


Red Blood Count


 3.88 x10^6/uL


(3.50-5.40) 


 





 


Hemoglobin


 10.7 g/dL


(12.0-15.5) 


 





 


Hematocrit


 32.7 %


(36.0-47.0) 


 





 


Mean Corpuscular Volume 84 fL ()   


 


Mean Corpuscular Hemoglobin 28 pg (25-35)   


 


Mean Corpuscular Hemoglobin


Concent 33 g/dL


(31-37) 


 





 


Red Cell Distribution Width


 15.1 %


(11.5-14.5) 


 





 


Platelet Count


 292 x10^3/uL


(140-400) 


 





 


Prothrombin Time


 16.2 SEC


(11.7-14.0) 


 





 


Prothromb Time International


Ratio 1.3 (0.8-1.1) 


 


 





 


Sodium Level


 143 mmol/L


(136-145) 


 





 


Potassium Level


 2.9 mmol/L


(3.5-5.1) 3.3 mmol/L


(3.5-5.1) 3.9 mmol/L


(3.5-5.1)


 


Chloride Level


 104 mmol/L


() 


 





 


Carbon Dioxide Level


 25 mmol/L


(21-32) 


 





 


Anion Gap 14 (6-14)   


 


Blood Urea Nitrogen


 19 mg/dL


(7-20) 


 





 


Creatinine


 1.2 mg/dL


(0.6-1.0) 


 





 


Estimated GFR


(Cockcroft-Gault) 44.7 


 


 





 


Glucose Level


 117 mg/dL


(70-99) 


 





 


Calcium Level


 9.5 mg/dL


(8.5-10.1) 


 





 


Magnesium Level


 


 


 2.1 mg/dL


(1.8-2.4)








Laboratory Tests








Test


 2/26/21


12:10 2/27/21


06:49


 


Potassium Level


 3.3 mmol/L


(3.5-5.1) 3.9 mmol/L


(3.5-5.1)


 


Magnesium Level


 


 2.1 mg/dL


(1.8-2.4)








Brief Hospital Course


Ms. Paredes  is a 66 old female with history of refractory dyspnea on exertion 

and symptomatic tachy-tammi/sick sinus syndrome underwent successful permanent 

pacemaker implantation.  Right heart cath did not show any significant pulm HTN 

and her LYON was thought to be secondary to COPD.  CXR did not show any 

pneumothorax and device check prior to DC showed normal function.  She will 

follow up with our office in one month.





Discharge Information


Condition at Discharge:  Stable


Follow Up:  Months (1)


Disposition/Orders:  D/C to Home


Scheduled


Albuterol Sulfate (Proair Hfa Inhaler) 8.5 Gm Hfa.aer.ad, 2 PUFF IH QID for 

ASTHMA, (Reported)


   LAST DOSE GIVEN: DATE: TIME: NEXT DOSE DUE: DATE: TIME: 


   Entered as Reported by: STUART BHAKTA on 1/21/15 0636


   Last Action: Reviewed on 2/26/21 0846 by FREDA SNOWDEN


Alprazolam (Xanax) 0.25 Mg Tablet, 1 TAB PO DAILY for rx, #30 (Reported)


   Entered as Reported by: FREDA SNOWDEN on 2/26/21 0846


   Last Action: New Order on 2/26/21 0846 by FREDA SNOWDEN


Atorvastatin Calcium (Atorvastatin Calcium) 20 Mg Tablet, 1 TAB PO DAILY for rx,

#30 Ref 5 (Reported)


   Entered as Reported by: FREDA SNOWDEN on 2/26/21 0846


   Last Action: Continued on 2/26/21 1049 by Cecilio Barry


Diltiazem Hcl (Diltiazem 24HR Cd) 120 Mg Cap.er.24h, 1 CAP PO DAILY for rx for 

30 Days, #30 Ref 0 (Reported)


   Entered as Reported by: FREDA SNOWDEN on 2/26/21 0846


   Last Action: Continued on 2/26/21 1049 by Cecilio Barry


Fluticasone/Salmeterol (Advair 100-50 Diskus) 1 Each Disk.w.dev, 1 PUFF IH BID, 

#1 Ref 5 (Reported)


   Entered as Reported by: NICHO BLACK on 3/30/17 1227


   Last Action: Reviewed on 2/26/21 0846 by FREDA SNOWDEN


Hydrochlorothiazide (Hydrochlorothiazide Tablet  **) 25 Mg Tablet, 25 MG PO 

DAILY for rx, Ref 0 (Reported)


   Entered as Reported by: JASON TEMPLETON on 10/15/15 1658


   Last Action: Continued on 2/26/21 1049 by Cecilio Barry


Melatonin (Melatonin) 5 Mg Tab.rapdis, 1 TAB PO QHS for sleep for 30 Days, #30 

Ref 0 (Reported)


   Entered as Reported by: FREDA SNOWDEN on 2/26/21 0846


   Last Action: New Order on 2/26/21 0846 by FREDA SNOWDEN


Omeprazole (Omeprazole) 40 Mg Capsule.dr, 1 CAP PO DAILY, #30 Ref 3 (Reported)


   Entered as Reported by: NICHO BLACK on 3/30/17 1227


   Last Action: Converted on 2/26/21 1049 by Cecilio Barry


Propranolol Hcl (Propranolol Hcl) 10 Mg Tablet, 20 MG PO BID for tremor for 30 

Days, #120 Ref 2


   Prescribed by: RAY VELEZ on 2/27/21 1002


Rivaroxaban (Xarelto) 20 Mg Tablet, 1 TAB PO DAILY for rx for 30 Days, #30 Ref 0

(Reported)


   with food 


   Entered as Reported by: FREDA SNOWDEN on 2/26/21 0846


   Last Action: New Order on 2/26/21 0846 by FREDA SNOWDEN


Sertraline Hcl (Zoloft) 50 Mg Tablet, 1 TAB PO DAILY for rx, #30 Ref 2 

(Reported)


   Entered as Reported by: FREDA SNOWDEN on 2/26/21 0846


   Last Action: Continued on 2/26/21 1049 by Cecilio Barry


Tiotropium Bromide (Spiriva) 18 Mcg Cap.w.dev, 1 CAP IH DAILY for rx, #30 Ref 3 

(Reported)


   Entered as Reported by: FREDA SNOWDEN on 2/26/21 0846


   Last Action: New Order on 2/26/21 0846 by FREDA SNOWDEN





Scheduled PRN


Alprazolam (Xanax) 0.25 Mg Tablet, 0.25 MG PO PRN Q6HRS PRN for ANXIETY / 

AGITATION, Ref 0 (Reported)


   LAST DOSE GIVEN: DATE: 11/4 TIME: 9 PM NEXT DOSE DUE: ANYTIME AS NEEDED FOR 

ANXIETY OR PAIN EVERY 6 HOURS. 


   Entered as Reported by: STUART BHAKTA on 1/21/15 0636


   Last Action: Reviewed on 2/26/21 0846 by FREDA SNOWDEN


Mv-Mn/Iron/Fa/Herbal Cmplx#190 (Vitamin D3 Complete Caplet) 1 Each Tablet, 5,000

UNITS PO DAILY PRN for rx, (Reported)


   Entered as Reported by: FREDA SNOWDEN on 2/26/21 0846


   Last Action: New Order on 2/26/21 0846 by FREDA SNOWDEN


Ondansetron Hcl (Zofran) 4 Mg Tablet, 4 MG PO BID PRN for NAUSEA/VOMITING, 

(Reported)


   Entered as Reported by: JASON TEMPLETON on 10/15/15 1701


   Last Action: Edited on 2/26/21 0846 by FREDA SNOWDEN





Justicifation of Admission Dx:


Justifications for Admission:


Justification of Admission Dx:  Yes











RAY VELEZ MD           Feb 27, 2021 10:03

## 2021-03-18 ENCOUNTER — HOSPITAL ENCOUNTER (EMERGENCY)
Dept: HOSPITAL 61 - ER | Age: 66
Discharge: HOME | End: 2021-03-18
Payer: MEDICARE

## 2021-03-18 VITALS
DIASTOLIC BLOOD PRESSURE: 70 MMHG | DIASTOLIC BLOOD PRESSURE: 70 MMHG | SYSTOLIC BLOOD PRESSURE: 157 MMHG | SYSTOLIC BLOOD PRESSURE: 157 MMHG | SYSTOLIC BLOOD PRESSURE: 157 MMHG | DIASTOLIC BLOOD PRESSURE: 70 MMHG

## 2021-03-18 VITALS — WEIGHT: 220.46 LBS | HEIGHT: 67 IN | BODY MASS INDEX: 34.6 KG/M2

## 2021-03-18 DIAGNOSIS — S90.31XA: ICD-10-CM

## 2021-03-18 DIAGNOSIS — Z90.710: ICD-10-CM

## 2021-03-18 DIAGNOSIS — R42: ICD-10-CM

## 2021-03-18 DIAGNOSIS — S80.02XA: ICD-10-CM

## 2021-03-18 DIAGNOSIS — F41.9: ICD-10-CM

## 2021-03-18 DIAGNOSIS — S40.012A: Primary | ICD-10-CM
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
## 2021-03-18 DIAGNOSIS — S80.01XA: ICD-10-CM

## 2021-03-18 DIAGNOSIS — Z88.8: ICD-10-CM

## 2021-03-18 DIAGNOSIS — F32.9: ICD-10-CM
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
## 2021-03-18 DIAGNOSIS — K21.9: ICD-10-CM

## 2021-03-18 DIAGNOSIS — S90.01XA: ICD-10-CM

## 2021-03-18 DIAGNOSIS — Z91.040: ICD-10-CM
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
## 2021-03-18 DIAGNOSIS — I10: ICD-10-CM

## 2021-03-18 DIAGNOSIS — J44.9: ICD-10-CM

## 2021-03-18 DIAGNOSIS — E78.00: ICD-10-CM

## 2021-03-18 DIAGNOSIS — Z90.49: ICD-10-CM
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
## 2021-03-18 PROCEDURE — 99284 EMERGENCY DEPT VISIT MOD MDM: CPT

## 2021-03-18 PROCEDURE — 70450 CT HEAD/BRAIN W/O DYE: CPT

## 2021-03-18 PROCEDURE — 73610 X-RAY EXAM OF ANKLE: CPT

## 2021-03-18 PROCEDURE — 72170 X-RAY EXAM OF PELVIS: CPT

## 2021-03-18 PROCEDURE — 73030 X-RAY EXAM OF SHOULDER: CPT

## 2021-03-18 NOTE — RAD
PROCEDURE:  XR SHOULDER_LEFT 2+ VIEWS 



STUDY DATE: 3/18/2021



CLINICAL INDICATION / HISTORY: Reason: trauma, PT FELL DOWN 1 STEP YESTERDAY, ALEJANDRO FOOT PAIN / Spl. In
structions:  / History: .



TECHNIQUE: AP internal and external rotation views with a Y- view were obtained of the left shoulder.




COMPARISON: Left shoulder x-rays 3/21/2018



FINDINGS: No fracture, dislocation or bone destruction is identified. There are no degenerative fleming
es at the left AC joint. Minimal osteophytic spurring on the humeral head is however present, compati
ble with mild glenohumeral degenerative change. No calcifications are seen in relation to the rotator
 cuff insertion. Left chest dual-chamber pacemaker newly noted.



IMPRESSION:  No acute osseous abnormality in the left shoulder with mild glenohumeral degenerative ch
marisa redemonstrated.









PROCEDURE:  XR PELVIS 1-2V



STUDY DATE: 3/18/2021



CLINICAL INDICATION / HISTORY: Reason: trauma, PT FELL DOWN 1 STEP YESTERDAY, ALEJANDRO FOOT PAIN / Spl. In
structions:  / History: .



TECHNIQUE:Single AP view of the pelvis was obtained



COMPARISON: Abdomen pelvis CT of 10/26/2020



FINDINGS: The osseous structures are normally mineralized. There is normal bony alignment present wit
h the femoral heads well-seated within the acetabuli. Left total hip arthroplasty is redemonstrated. 
There is no evidence of acute fracture or dislocation identified. Moderately advanced joints space na
rrowing and osteophytic spurring on the femoral head on the right is present, consistent with hip deg
enerative change. The overlying soft tissues are grossly unremarkable.



IMPRESSION: No evidence of pelvic ring or hip fracture or dislocation





PROCEDURE:  XR KNEE 3 VIEWS



STUDY DATE: 3/18/2021



CLINICAL INDICATION / HISTORY: Reason: trauma, PT FELL DOWN 1 STEP YESTERDAY, ALEJANDRO FOOT PAIN / Spl. In
structions:  / History: .



TECHNIQUE: AP, lateral, and tunnel views of the right and left knees.



COMPARISON: None



FINDINGS: The osseous structures are intact.  The articular surfaces are smooth.  The joint space is 
maintained.   No intra-articular loose bodies.  The alignment is within normal limits.  The soft tiss
ues are unremarkable. No obvious joint effusion.   No radio-opaque foreign bodies are identified.



IMPRESSION: No fracture or dislocation is identified in either the right or left knee.









PROCEDURE:  XR EXAM OF ANKLE_RIGHT 3VIEWS



STUDY DATE: 3/18/2021



CLINICAL INDICATION / HISTORY: Reason: trauma, PT FELL DOWN 1 STEP YESTERDAY, ALEJANDRO FOOT PAIN / Spl. In
structions:  / History: .



TECHNIQUE: Right ankle 3 views.



COMPARISON: None



FINDINGS: The ankle mortise is approximated, and the talar dome is unremarkable. The joint space widt
hs are maintained. No fracture or dislocation is identified. No soft tissue swelling is appreciated. 
Small calcaneal spur is present.



IMPRESSION: No acute osseous abnormality in the right ankle.







PROCEDURE: XR FEET 3 VIEWS 



STUDY DATE: 3/18/2021



CLINICAL INDICATION / HISTORY: Reason: trauma, PT FELL DOWN 1 STEP YESTERDAY, ALEJANDRO FOOT PAIN / Spl. In
structions:  / History: .



TECHNIQUE: AP, lateral and oblique views of the right and left foot.



COMPARISON: None



FINDINGS: No fracture or dislocation is identified. The bone density is normal. The joint space width
s are maintained, and there are no erosions to suggest an inflammatory arthropathy. No soft tissue ab
normality is seen.



IMPRESSION: No acute osseous abnormality in either foot is demonstrated.



Electronically signed by: Chela Bustos MD (3/18/2021 8:29 AM) [id number]

## 2021-03-18 NOTE — RAD
PROCEDURE:  XR SHOULDER_LEFT 2+ VIEWS 



STUDY DATE: 3/18/2021



CLINICAL INDICATION / HISTORY: Reason: trauma, PT FELL DOWN 1 STEP YESTERDAY, ALEJANDRO FOOT PAIN / Spl. In
structions:  / History: .



TECHNIQUE: AP internal and external rotation views with a Y- view were obtained of the left shoulder.




COMPARISON: Left shoulder x-rays 3/21/2018



FINDINGS: No fracture, dislocation or bone destruction is identified. There are no degenerative fleming
es at the left AC joint. Minimal osteophytic spurring on the humeral head is however present, compati
ble with mild glenohumeral degenerative change. No calcifications are seen in relation to the rotator
 cuff insertion. Left chest dual-chamber pacemaker newly noted.



IMPRESSION:  No acute osseous abnormality in the left shoulder with mild glenohumeral degenerative ch
marisa redemonstrated.









PROCEDURE:  XR PELVIS 1-2V



STUDY DATE: 3/18/2021



CLINICAL INDICATION / HISTORY: Reason: trauma, PT FELL DOWN 1 STEP YESTERDAY, ALEJANDRO FOOT PAIN / Spl. In
structions:  / History: .



TECHNIQUE:Single AP view of the pelvis was obtained



COMPARISON: Abdomen pelvis CT of 10/26/2020



FINDINGS: The osseous structures are normally mineralized. There is normal bony alignment present wit
h the femoral heads well-seated within the acetabuli. Left total hip arthroplasty is redemonstrated. 
There is no evidence of acute fracture or dislocation identified. Moderately advanced joints space na
rrowing and osteophytic spurring on the femoral head on the right is present, consistent with hip deg
enerative change. The overlying soft tissues are grossly unremarkable.



IMPRESSION: No evidence of pelvic ring or hip fracture or dislocation





PROCEDURE:  XR KNEE 3 VIEWS



STUDY DATE: 3/18/2021



CLINICAL INDICATION / HISTORY: Reason: trauma, PT FELL DOWN 1 STEP YESTERDAY, ALEJANDRO FOOT PAIN / Spl. In
structions:  / History: .



TECHNIQUE: AP, lateral, and tunnel views of the right and left knees.



COMPARISON: None



FINDINGS: The osseous structures are intact.  The articular surfaces are smooth.  The joint space is 
maintained.   No intra-articular loose bodies.  The alignment is within normal limits.  The soft tiss
ues are unremarkable. No obvious joint effusion.   No radio-opaque foreign bodies are identified.



IMPRESSION: No fracture or dislocation is identified in either the right or left knee.









PROCEDURE:  XR EXAM OF ANKLE_RIGHT 3VIEWS



STUDY DATE: 3/18/2021



CLINICAL INDICATION / HISTORY: Reason: trauma, PT FELL DOWN 1 STEP YESTERDAY, ALEJANDRO FOOT PAIN / Spl. In
structions:  / History: .



TECHNIQUE: Right ankle 3 views.



COMPARISON: None



FINDINGS: The ankle mortise is approximated, and the talar dome is unremarkable. The joint space widt
hs are maintained. No fracture or dislocation is identified. No soft tissue swelling is appreciated. 
Small calcaneal spur is present.



IMPRESSION: No acute osseous abnormality in the right ankle.







PROCEDURE: XR FEET 3 VIEWS 



STUDY DATE: 3/18/2021



CLINICAL INDICATION / HISTORY: Reason: trauma, PT FELL DOWN 1 STEP YESTERDAY, ALEJANDRO FOOT PAIN / Spl. In
structions:  / History: .



TECHNIQUE: AP, lateral and oblique views of the right and left foot.



COMPARISON: None



FINDINGS: No fracture or dislocation is identified. The bone density is normal. The joint space width
s are maintained, and there are no erosions to suggest an inflammatory arthropathy. No soft tissue ab
normality is seen.



IMPRESSION: No acute osseous abnormality in either foot is demonstrated.



Electronically signed by: Chela Bustos MD (3/18/2021 8:29 AM) [id number]

## 2021-03-18 NOTE — RAD
PROCEDURE:  XR SHOULDER_LEFT 2+ VIEWS 



STUDY DATE: 3/18/2021



CLINICAL INDICATION / HISTORY: Reason: trauma, PT FELL DOWN 1 STEP YESTERDAY, ALEJANDRO FOOT PAIN / Spl. In
structions:  / History: .



TECHNIQUE: AP internal and external rotation views with a Y- view were obtained of the left shoulder.




COMPARISON: Left shoulder x-rays 3/21/2018



FINDINGS: No fracture, dislocation or bone destruction is identified. There are no degenerative fleming
es at the left AC joint. Minimal osteophytic spurring on the humeral head is however present, compati
ble with mild glenohumeral degenerative change. No calcifications are seen in relation to the rotator
 cuff insertion. Left chest dual-chamber pacemaker newly noted.



IMPRESSION:  No acute osseous abnormality in the left shoulder with mild glenohumeral degenerative ch
marisa redemonstrated.









PROCEDURE:  XR PELVIS 1-2V



STUDY DATE: 3/18/2021



CLINICAL INDICATION / HISTORY: Reason: trauma, PT FELL DOWN 1 STEP YESTERDAY, ALEJANDRO FOOT PAIN / Spl. In
structions:  / History: .



TECHNIQUE:Single AP view of the pelvis was obtained



COMPARISON: Abdomen pelvis CT of 10/26/2020



FINDINGS: The osseous structures are normally mineralized. There is normal bony alignment present wit
h the femoral heads well-seated within the acetabuli. Left total hip arthroplasty is redemonstrated. 
There is no evidence of acute fracture or dislocation identified. Moderately advanced joints space na
rrowing and osteophytic spurring on the femoral head on the right is present, consistent with hip deg
enerative change. The overlying soft tissues are grossly unremarkable.



IMPRESSION: No evidence of pelvic ring or hip fracture or dislocation





PROCEDURE:  XR KNEE 3 VIEWS



STUDY DATE: 3/18/2021



CLINICAL INDICATION / HISTORY: Reason: trauma, PT FELL DOWN 1 STEP YESTERDAY, ALEJANDRO FOOT PAIN / Spl. In
structions:  / History: .



TECHNIQUE: AP, lateral, and tunnel views of the right and left knees.



COMPARISON: None



FINDINGS: The osseous structures are intact.  The articular surfaces are smooth.  The joint space is 
maintained.   No intra-articular loose bodies.  The alignment is within normal limits.  The soft tiss
ues are unremarkable. No obvious joint effusion.   No radio-opaque foreign bodies are identified.



IMPRESSION: No fracture or dislocation is identified in either the right or left knee.









PROCEDURE:  XR EXAM OF ANKLE_RIGHT 3VIEWS



STUDY DATE: 3/18/2021



CLINICAL INDICATION / HISTORY: Reason: trauma, PT FELL DOWN 1 STEP YESTERDAY, ALEJANDRO FOOT PAIN / Spl. In
structions:  / History: .



TECHNIQUE: Right ankle 3 views.



COMPARISON: None



FINDINGS: The ankle mortise is approximated, and the talar dome is unremarkable. The joint space widt
hs are maintained. No fracture or dislocation is identified. No soft tissue swelling is appreciated. 
Small calcaneal spur is present.



IMPRESSION: No acute osseous abnormality in the right ankle.







PROCEDURE: XR FEET 3 VIEWS 



STUDY DATE: 3/18/2021



CLINICAL INDICATION / HISTORY: Reason: trauma, PT FELL DOWN 1 STEP YESTERDAY, ALEJANDRO FOOT PAIN / Spl. In
structions:  / History: .



TECHNIQUE: AP, lateral and oblique views of the right and left foot.



COMPARISON: None



FINDINGS: No fracture or dislocation is identified. The bone density is normal. The joint space width
s are maintained, and there are no erosions to suggest an inflammatory arthropathy. No soft tissue ab
normality is seen.



IMPRESSION: No acute osseous abnormality in either foot is demonstrated.



Electronically signed by: Chela Bustos MD (3/18/2021 8:29 AM) [id number]

## 2021-03-18 NOTE — RAD
PROCEDURE:  XR SHOULDER_LEFT 2+ VIEWS 



STUDY DATE: 3/18/2021



CLINICAL INDICATION / HISTORY: Reason: trauma, PT FELL DOWN 1 STEP YESTERDAY, ALEJANDRO FOOT PAIN / Spl. In
structions:  / History: .



TECHNIQUE: AP internal and external rotation views with a Y- view were obtained of the left shoulder.




COMPARISON: Left shoulder x-rays 3/21/2018



FINDINGS: No fracture, dislocation or bone destruction is identified. There are no degenerative fleming
es at the left AC joint. Minimal osteophytic spurring on the humeral head is however present, compati
ble with mild glenohumeral degenerative change. No calcifications are seen in relation to the rotator
 cuff insertion. Left chest dual-chamber pacemaker newly noted.



IMPRESSION:  No acute osseous abnormality in the left shoulder with mild glenohumeral degenerative ch
marisa redemonstrated.









PROCEDURE:  XR PELVIS 1-2V



STUDY DATE: 3/18/2021



CLINICAL INDICATION / HISTORY: Reason: trauma, PT FELL DOWN 1 STEP YESTERDAY, ALEJANDRO FOOT PAIN / Spl. In
structions:  / History: .



TECHNIQUE:Single AP view of the pelvis was obtained



COMPARISON: Abdomen pelvis CT of 10/26/2020



FINDINGS: The osseous structures are normally mineralized. There is normal bony alignment present wit
h the femoral heads well-seated within the acetabuli. Left total hip arthroplasty is redemonstrated. 
There is no evidence of acute fracture or dislocation identified. Moderately advanced joints space na
rrowing and osteophytic spurring on the femoral head on the right is present, consistent with hip deg
enerative change. The overlying soft tissues are grossly unremarkable.



IMPRESSION: No evidence of pelvic ring or hip fracture or dislocation





PROCEDURE:  XR KNEE 3 VIEWS



STUDY DATE: 3/18/2021



CLINICAL INDICATION / HISTORY: Reason: trauma, PT FELL DOWN 1 STEP YESTERDAY, ALEJANDRO FOOT PAIN / Spl. In
structions:  / History: .



TECHNIQUE: AP, lateral, and tunnel views of the right and left knees.



COMPARISON: None



FINDINGS: The osseous structures are intact.  The articular surfaces are smooth.  The joint space is 
maintained.   No intra-articular loose bodies.  The alignment is within normal limits.  The soft tiss
ues are unremarkable. No obvious joint effusion.   No radio-opaque foreign bodies are identified.



IMPRESSION: No fracture or dislocation is identified in either the right or left knee.









PROCEDURE:  XR EXAM OF ANKLE_RIGHT 3VIEWS



STUDY DATE: 3/18/2021



CLINICAL INDICATION / HISTORY: Reason: trauma, PT FELL DOWN 1 STEP YESTERDAY, ALEJANDRO FOOT PAIN / Spl. In
structions:  / History: .



TECHNIQUE: Right ankle 3 views.



COMPARISON: None



FINDINGS: The ankle mortise is approximated, and the talar dome is unremarkable. The joint space widt
hs are maintained. No fracture or dislocation is identified. No soft tissue swelling is appreciated. 
Small calcaneal spur is present.



IMPRESSION: No acute osseous abnormality in the right ankle.







PROCEDURE: XR FEET 3 VIEWS 



STUDY DATE: 3/18/2021



CLINICAL INDICATION / HISTORY: Reason: trauma, PT FELL DOWN 1 STEP YESTERDAY, ALEJANDRO FOOT PAIN / Spl. In
structions:  / History: .



TECHNIQUE: AP, lateral and oblique views of the right and left foot.



COMPARISON: None



FINDINGS: No fracture or dislocation is identified. The bone density is normal. The joint space width
s are maintained, and there are no erosions to suggest an inflammatory arthropathy. No soft tissue ab
normality is seen.



IMPRESSION: No acute osseous abnormality in either foot is demonstrated.



Electronically signed by: Chela Bustos MD (3/18/2021 8:29 AM) [id number]

## 2021-03-18 NOTE — ED.ADGEN
Past Medical History


Past Medical History:  Anxiety, Asthma, COPD, Depression, GERD, High [place]

sterol, Hypertension, Other


Additional Past Medical Histor:  TREMOR


Past Surgical History:  Cholecystectomy, Hysterectomy, Other


Smoking Status:  Former Smoker


Alcohol Use:  None


Drug Use:  None





General Adult


EDM:


Chief Complaint:  MECHANICAL FALL





HPI:


HPI:





Patient is a 66-year-old female who arrives by private vehicle to the emergency 

department complaining of multiple injuries sustained after a fall yesterday 

evening.  Patient was closing her garage door when she reentered her house and 

experienced her legs giving out upon her.  In falling the patient sustained 

multiple injuries.  Specifically she injured her left shoulder as well as right 

knee, right ankle and right foot.  Patient also reports the pain in her toes of 

her left foot stating that they were also impacted by her fall.  The patient 

states despite falling she did not hit her head.  She further states she did not

have any prodromal symptoms prior to falling such as feeling lightheaded and sta

[place] she never lost consciousness.  Patient states however today she is very sore

from her fall with the majority of her pain in her left shoulder as well as 

right lower extremity.  She denies any history of chest pain before or since the

fall.  She further denies any neurological change to her baseline.  She is 

awake, alert and nontoxic-appearing.





Review of Systems:


Review of Systems:


Constitutional:   Denies fever or chills. []


Eyes:   Denies change in visual acuity. []


HENT:   Denies nasal congestion or sore throat. [] 


Respiratory:   Denies cough or shortness of breath. [] 


Cardiovascular:   Denies chest pain or edema. [] 


GI:   Denies abdominal pain, nausea, vomiting, bloody stools or diarrhea. [] 


:  Denies dysuria. [] 


Musculoskeletal:   Denies back pain or joint pain. [] 


Integument:   Denies rash. [] 


Neurologic:   Denies headache, focal weakness or sensory changes. [] 


Endocrine:   Denies polyuria or polydipsia. [] 


Lymphatic:  Denies swollen glands. [] 


Psychiatric:  Denies depression or anxiety. []





Family History:


Family History:


Noncontributory





Allergies:


Allergies:





Allergies








Coded Allergies Type Severity Reaction Last Updated Verified


 


  diltiazem Allergy Severe severe bradycardia w/syncope 21 Yes


 


  regadenoson Allergy Severe LIPS SWELLED AND HIVES ON CHEST AND ARMS 3/30/17 

Yes


 


  citalopram Allergy Intermediate hives 21 Yes


 


  Latex, Natural Rubber Allergy Mild hives 21 Yes


 


  I S O L A T I O N *CONTACT* Allergy Unknown  3/30/17 Yes











Physical Exam:


PE:





Constitutional: Well developed, well nourished, no acute distress, non-toxic 

appearance. []


HENT: Normocephalic, atraumatic, bilateral external ears normal, oropharynx 

moist, no oral exudates, nose normal. []


Eyes: PERRLA, EOMI, conjunctiva normal, no discharge. [] 


Neck: Normal range of motion, no tenderness, supple, no stridor. [] 


Cardiovascular:Heart rate regular rhythm, no murmur []


Lungs & Thorax:  Bilateral breath sounds clear to auscultation []


Abdomen: Bowel sounds normal, soft, no tenderness, no masses, no pulsatile 

masses. [] 


Skin: Warm, dry, no erythema, no rash. [] 


Back: No tenderness, no CVA tenderness. [] 


Extremities: Patient is contusion at the left shoulder.  There is tenderness 

palpation at this site as well.  Additionally the patient has minimal ecchymosis

 and swelling of the right foot and ankle present.  No cyanosis, no clubbing, 

ROM intact, no edema. [] 


Neurologic: Alert and oriented X 3, normal motor function, normal sensory 

function, no focal deficits noted. []


Psychologic: Affect normal, judgement normal, mood normal. []





Current Patient Data:


Vital Signs:





                                   Vital Signs








  Date Time  Temp Pulse Resp B/P (MAP) Pulse Ox O2 Delivery O2 Flow Rate FiO2


 


3/18/21 07:37 98.0 69 18 157/70 (99) 98 Room Air  





 98.0       











EKG:


EKG:


[]





Heart Score:


C/O Chest Pain:  No


Risk Factors:


Risk Factors:  DM, Current or recent (<one month) smoker, HTN, HLP, family hi

story of CAD, obesity.


Risk Scores:


Score 0 - 3:  2.5% MACE over next 6 weeks - Discharge Home


Score 4 - 6:  20.3% MACE over next 6 weeks - Admit for Clinical Observation


Score 7 - 10:  72.7% MACE over next 6 weeks - Early Invasive Strategies





Radiology/Procedures:


Radiology/Procedures:


[]


Impression:


[facility]


                    [address] [place] [address]


                                 [phone]


                                        


                                 IMAGING REPORT





                                     Signed





PATIENT: CELESTINO GARCIA   ACCOUNT: [id number]     MRN#: [id number]


: 1955           LOCATION: ER              AGE: 66


SEX: F                    EXAM DT: 21         ACCESSION#: [id number]


STATUS: REG ER            ORD. PHYSICIAN: RYNE BURRELL DO


REASON: trauma, PT FELL DOWN 1 STEP YESTERDAY, HIP PAIN


PROCEDURE: PELVIS





PROCEDURE:  XR SHOULDER_LEFT 2+ VIEWS 





STUDY DATE: 3/18/2021





CLINICAL INDICATION / HISTORY: Reason: trauma, PT FELL DOWN 1 STEP YESTERDAY, 

ALEJANDRO FOOT PAIN / Spl. Instructions:  / History: .





TECHNIQUE: AP internal and external rotation views with a Y- view were obtained 

of the left shoulder.





COMPARISON: Left shoulder x-rays 3/21/2018





FINDINGS: No fracture, dislocation or bone destruction is identified. There are 

no degenerative changes at the left AC joint. Minimal osteophytic spurring on th

e humeral head is however present, compatible with mild glenohumeral 

degenerative change. No calcifications are seen in relation to the rotator cuff 

insertion. Left chest dual-chamber pacemaker newly noted.





IMPRESSION:  No acute osseous abnormality in the left shoulder with mild 

glenohumeral degenerative change redemonstrated.














PROCEDURE:  XR PELVIS 1-2V





STUDY DATE: 3/18/2021





CLINICAL INDICATION / HISTORY: Reason: trauma, PT FELL DOWN 1 STEP YESTERDAY, 

ALEJANDRO FOOT PAIN / Spl. Instructions:  / History: .





TECHNIQUE:Single AP view of the pelvis was obtained





COMPARISON: Abdomen pelvis CT of 10/26/2020





FINDINGS: The osseous structures are normally mineralized. There is normal bony 

alignment present with the femoral heads well-seated within the acetabuli. Left 

total hip arthroplasty is redemonstrated. There is no evidence of acute fracture

 or dislocation identified. Moderately advanced joints space narrowing and 

osteophytic spurring on the femoral head on the right is present, consistent 

with hip degenerative change. The overlying soft tissues are grossly 

unremarkable.





IMPRESSION: No evidence of pelvic ring or hip fracture or dislocation








PROCEDURE:  XR KNEE 3 VIEWS





STUDY DATE: 3/18/2021





CLINICAL INDICATION / HISTORY: Reason: trauma, PT FELL DOWN 1 STEP YESTERDAY, 

ALEJANDRO FOOT PAIN / Spl. Instructions:  / History: .





TECHNIQUE: AP, lateral, and tunnel views of the right and left knees.





COMPARISON: None





FINDINGS: The osseous structures are intact.  The articular surfaces are smooth.

  The joint space is maintained.   No intra-articular loose bodies.  The 

alignment is within normal limits.  The soft tissues are unremarkable. No 

obvious joint effusion.   No radio-opaque foreign bodies are identified.





IMPRESSION: No fracture or dislocation is identified in either the right or left

 knee.














PROCEDURE:  XR EXAM OF ANKLE_RIGHT 3VIEWS





STUDY DATE: 3/18/2021





CLINICAL INDICATION / HISTORY: Reason: trauma, PT FELL DOWN 1 STEP YESTERDAY, 

ALEJANDRO FOOT PAIN / Spl. Instructions:  / History: .





TECHNIQUE: Right ankle 3 views.





COMPARISON: None





FINDINGS: The ankle mortise is approximated, and the talar dome is unremarkable.

 The joint space widths are maintained. No fracture or dislocation is 

identified. No soft tissue swelling is appreciated. Small calcaneal spur is 

present.





IMPRESSION: No acute osseous abnormality in the right ankle.











PROCEDURE: XR FEET 3 VIEWS 





STUDY DATE: 3/18/2021





CLINICAL INDICATION / HISTORY: Reason: trauma, PT FELL DOWN 1 STEP YESTERDAY, 

ALEJANDRO FOOT PAIN / Spl. Instructions:  / History: .





TECHNIQUE: AP, lateral and oblique views of the right and left foot.





COMPARISON: None





FINDINGS: No fracture or dislocation is identified. The bone density is normal. 

The joint space widths are maintained, and there are no erosions to suggest an 

inflammatory arthropathy. No soft tissue abnormality is seen.





IMPRESSION: No acute osseous abnormality in either foot is demonstrated.





Electronically signed by: Mihaela Bustos MD (3/18/2021 8:29 AM) [id number]














DICTATED and SIGNED BY:     MIHAELA BUSTOS MD


DATE:     21 [id number] 0


                            [facility]


                    [address] [place] [address]


                                 [phone]


                                        


                                 IMAGING REPORT





                                     Signed





PATIENT: CELESTINO GARCIA   ACCOUNT: [id number]     MRN#: [id number]


: 1955           LOCATION: ER              AGE: 66


SEX: F                    EXAM DT: 21         ACCESSION#: [id number]


STATUS: PRE ER            ORD. PHYSICIAN: RYNE BURRELL DO


REASON: trauma


PROCEDURE: CT HEAD WO CONTRAST





EXAM: CT Head without IV contrast





INDICATION: Reason: trauma / Spl. Instructions:  / History: 





TECHNIQUE: Multi-detector row CT images were obtained of the head without the 

use of IV contrast. All CT scans performed at this facility utilize dose 

optimization techniques as appropriate to the exam, including the following: 

Automated exposure control and adjustment of the mA and/or KV according to 

patient size (this includes techniques or standardized protocols for targeted 

exams where dose is indication/reason for exam).





COMPARISON: None





FINDINGS: 





BRAIN PARENCHYMA: No evidence of acute intraparenchymal hemorrhage or infarct. 

Lucency in the left frontal subcortical white matter is compatible with chronic 

lacunar infarct.





VENTRICLES & EXTRA-AXIAL SPACES:  Ventricles are within normal limits. Basilar 

cisterns are patent. No pathologic extra-axial fluid collection or mass.





ORBITS: Orbital contents are unremarkable.





SINUSES:  Visualized paranasal sinuses and mastoid air cells are clear.





OSSEOUS & SOFT TISSUES:  Calvarium and skull base are intact. A 1.1 cm 

groundglass density in the midline frontal bone above the frontal sinuses is 

present, suggestive of an intraosseous hemangioma.





IMPRESSION:





No acute intracranial pathology.





Electronically signed by: Mihaela Bustos MD (3/18/2021 8:17 AM) [id number]














DICTATED and SIGNED BY:     MIHAELA BUSTOS MD


DATE:     21 [id number] 0








Course & Med Decision Making:


Course & Med Decision Making


Pertinent Labs and Imaging studies reviewed. (See chart for details)





[]





Dragon Disclaimer:


Dragon Disclaimer:


This electronic medical record was generated, in whole or in part, using a voice

 recognition dictation system.





Departure


Departure


Impression:  


   Primary Impression:  


   Accidental fall


   Additional Impressions:  


   Contusion of left shoulder


   Contusion of right ankle, initial encounter


   Contusion of right foot


   Contusion of right knee, initial encounter


   Contusion of left knee, initial encounter


Disposition:  01 DC HOME SELF CARE/HOMELESS


Condition:  GOOD


Referrals:  


XAVIER FUNEZ MD (PCP)


Patient Instructions:  Contusion, Fall Prevention and Home Safety





Problem Qualifiers











RYNE BURRELL DO               Mar 18, 2021 07:50

## 2021-03-18 NOTE — RAD
EXAM: CT Head without IV contrast



INDICATION: Reason: trauma / Spl. Instructions:  / History: 



TECHNIQUE: Multi-detector row CT images were obtained of the head without the use of IV contrast. All
 CT scans performed at this facility utilize dose optimization techniques as appropriate to the exam,
 including the following: Automated exposure control and adjustment of the mA and/or KV according to 
patient size (this includes techniques or standardized protocols for targeted exams where dose is ind
ication/reason for exam).



COMPARISON: None



FINDINGS: 



BRAIN PARENCHYMA: No evidence of acute intraparenchymal hemorrhage or infarct. Lucency in the left fr
ontal subcortical white matter is compatible with chronic lacunar infarct.



VENTRICLES & EXTRA-AXIAL SPACES:  Ventricles are within normal limits. Basilar cisterns are patent. N
o pathologic extra-axial fluid collection or mass.



ORBITS: Orbital contents are unremarkable.



SINUSES:  Visualized paranasal sinuses and mastoid air cells are clear.



OSSEOUS & SOFT TISSUES:  Calvarium and skull base are intact. A 1.1 cm groundglass density in the mid
line frontal bone above the frontal sinuses is present, suggestive of an intraosseous hemangioma.



IMPRESSION:



No acute intracranial pathology.



Electronically signed by: Chela Bustos MD (3/18/2021 8:17 AM) [id number]

## 2021-03-18 NOTE — RAD
PROCEDURE:  XR SHOULDER_LEFT 2+ VIEWS 



STUDY DATE: 3/18/2021



CLINICAL INDICATION / HISTORY: Reason: trauma, PT FELL DOWN 1 STEP YESTERDAY, ALEJANDRO FOOT PAIN / Spl. In
structions:  / History: .



TECHNIQUE: AP internal and external rotation views with a Y- view were obtained of the left shoulder.




COMPARISON: Left shoulder x-rays 3/21/2018



FINDINGS: No fracture, dislocation or bone destruction is identified. There are no degenerative fleming
es at the left AC joint. Minimal osteophytic spurring on the humeral head is however present, compati
ble with mild glenohumeral degenerative change. No calcifications are seen in relation to the rotator
 cuff insertion. Left chest dual-chamber pacemaker newly noted.



IMPRESSION:  No acute osseous abnormality in the left shoulder with mild glenohumeral degenerative ch
marisa redemonstrated.









PROCEDURE:  XR PELVIS 1-2V



STUDY DATE: 3/18/2021



CLINICAL INDICATION / HISTORY: Reason: trauma, PT FELL DOWN 1 STEP YESTERDAY, ALEJANDRO FOOT PAIN / Spl. In
structions:  / History: .



TECHNIQUE:Single AP view of the pelvis was obtained



COMPARISON: Abdomen pelvis CT of 10/26/2020



FINDINGS: The osseous structures are normally mineralized. There is normal bony alignment present wit
h the femoral heads well-seated within the acetabuli. Left total hip arthroplasty is redemonstrated. 
There is no evidence of acute fracture or dislocation identified. Moderately advanced joints space na
rrowing and osteophytic spurring on the femoral head on the right is present, consistent with hip deg
enerative change. The overlying soft tissues are grossly unremarkable.



IMPRESSION: No evidence of pelvic ring or hip fracture or dislocation





PROCEDURE:  XR KNEE 3 VIEWS



STUDY DATE: 3/18/2021



CLINICAL INDICATION / HISTORY: Reason: trauma, PT FELL DOWN 1 STEP YESTERDAY, ALEJANDRO FOOT PAIN / Spl. In
structions:  / History: .



TECHNIQUE: AP, lateral, and tunnel views of the right and left knees.



COMPARISON: None



FINDINGS: The osseous structures are intact.  The articular surfaces are smooth.  The joint space is 
maintained.   No intra-articular loose bodies.  The alignment is within normal limits.  The soft tiss
ues are unremarkable. No obvious joint effusion.   No radio-opaque foreign bodies are identified.



IMPRESSION: No fracture or dislocation is identified in either the right or left knee.









PROCEDURE:  XR EXAM OF ANKLE_RIGHT 3VIEWS



STUDY DATE: 3/18/2021



CLINICAL INDICATION / HISTORY: Reason: trauma, PT FELL DOWN 1 STEP YESTERDAY, ALEJANDRO FOOT PAIN / Spl. In
structions:  / History: .



TECHNIQUE: Right ankle 3 views.



COMPARISON: None



FINDINGS: The ankle mortise is approximated, and the talar dome is unremarkable. The joint space widt
hs are maintained. No fracture or dislocation is identified. No soft tissue swelling is appreciated. 
Small calcaneal spur is present.



IMPRESSION: No acute osseous abnormality in the right ankle.







PROCEDURE: XR FEET 3 VIEWS 



STUDY DATE: 3/18/2021



CLINICAL INDICATION / HISTORY: Reason: trauma, PT FELL DOWN 1 STEP YESTERDAY, ALEJANDRO FOOT PAIN / Spl. In
structions:  / History: .



TECHNIQUE: AP, lateral and oblique views of the right and left foot.



COMPARISON: None



FINDINGS: No fracture or dislocation is identified. The bone density is normal. The joint space width
s are maintained, and there are no erosions to suggest an inflammatory arthropathy. No soft tissue ab
normality is seen.



IMPRESSION: No acute osseous abnormality in either foot is demonstrated.



Electronically signed by: Chela Bustos MD (3/18/2021 8:29 AM) [id number]

## 2021-05-24 ENCOUNTER — HOSPITAL ENCOUNTER (OUTPATIENT)
Dept: HOSPITAL 61 - SURGPAT | Age: 66
End: 2021-05-24
Attending: ORTHOPAEDIC SURGERY
Payer: MEDICARE

## 2021-05-24 DIAGNOSIS — M16.11: ICD-10-CM

## 2021-05-24 DIAGNOSIS — Z01.818: Primary | ICD-10-CM

## 2021-05-24 DIAGNOSIS — Z79.899: ICD-10-CM

## 2021-05-24 DIAGNOSIS — I48.0: ICD-10-CM

## 2021-05-24 LAB
ALBUMIN SERPL-MCNC: 3.9 G/DL (ref 3.4–5)
ANION GAP SERPL CALC-SCNC: 13 MMOL/L (ref 6–14)
APTT BLD: 40 SEC (ref 24–38)
BASOPHILS # BLD AUTO: 0 X10^3/UL (ref 0–0.2)
BASOPHILS NFR BLD: 0 % (ref 0–3)
BUN SERPL-MCNC: 19 MG/DL (ref 7–20)
CALCIUM SERPL-MCNC: 9 MG/DL (ref 8.5–10.1)
CHLORIDE SERPL-SCNC: 109 MMOL/L (ref 98–107)
CO2 SERPL-SCNC: 22 MMOL/L (ref 21–32)
CREAT SERPL-MCNC: 1.3 MG/DL (ref 0.6–1)
EOSINOPHIL NFR BLD: 0.1 X10^3/UL (ref 0–0.7)
EOSINOPHIL NFR BLD: 3 % (ref 0–3)
ERYTHROCYTE [DISTWIDTH] IN BLOOD BY AUTOMATED COUNT: 16.9 % (ref 11.5–14.5)
GFR SERPLBLD BASED ON 1.73 SQ M-ARVRAT: 40.7 ML/MIN
GLUCOSE SERPL-MCNC: 92 MG/DL (ref 70–99)
HCT VFR BLD CALC: 35.3 % (ref 36–47)
HGB BLD-MCNC: 11.6 G/DL (ref 12–15.5)
LYMPHOCYTES # BLD: 1.7 X10^3/UL (ref 1–4.8)
LYMPHOCYTES NFR BLD AUTO: 31 % (ref 24–48)
MCH RBC QN AUTO: 27 PG (ref 25–35)
MCHC RBC AUTO-ENTMCNC: 33 G/DL (ref 31–37)
MCV RBC AUTO: 83 FL (ref 79–100)
MONO #: 0.5 X10^3/UL (ref 0–1.1)
MONOCYTES NFR BLD: 9 % (ref 0–9)
NEUT #: 3.2 X10^3/UL (ref 1.8–7.7)
NEUTROPHILS NFR BLD AUTO: 58 % (ref 31–73)
PLATELET # BLD AUTO: 305 X10^3/UL (ref 140–400)
POTASSIUM SERPL-SCNC: 4.3 MMOL/L (ref 3.5–5.1)
PROTHROMBIN TIME: 18 SEC (ref 11.7–14)
RBC # BLD AUTO: 4.26 X10^6/UL (ref 3.5–5.4)
SODIUM SERPL-SCNC: 144 MMOL/L (ref 136–145)
WBC # BLD AUTO: 5.6 X10^3/UL (ref 4–11)

## 2021-05-24 PROCEDURE — 85730 THROMBOPLASTIN TIME PARTIAL: CPT

## 2021-05-24 PROCEDURE — 82306 VITAMIN D 25 HYDROXY: CPT

## 2021-05-24 PROCEDURE — 87641 MR-STAPH DNA AMP PROBE: CPT

## 2021-05-24 PROCEDURE — 36415 COLL VENOUS BLD VENIPUNCTURE: CPT

## 2021-05-24 PROCEDURE — 82040 ASSAY OF SERUM ALBUMIN: CPT

## 2021-05-24 PROCEDURE — 85025 COMPLETE CBC W/AUTO DIFF WBC: CPT

## 2021-05-24 PROCEDURE — 83036 HEMOGLOBIN GLYCOSYLATED A1C: CPT

## 2021-05-24 PROCEDURE — 85651 RBC SED RATE NONAUTOMATED: CPT

## 2021-05-24 PROCEDURE — 80048 BASIC METABOLIC PNL TOTAL CA: CPT

## 2021-05-24 PROCEDURE — 85610 PROTHROMBIN TIME: CPT

## 2021-05-25 LAB — HBA1C MFR BLD: 5.8 % (ref 4.8–5.6)

## 2022-01-13 ENCOUNTER — HOSPITAL ENCOUNTER (OUTPATIENT)
Dept: HOSPITAL 61 - ECHO | Age: 67
End: 2022-01-13
Attending: INTERNAL MEDICINE
Payer: MEDICARE

## 2022-01-13 DIAGNOSIS — I48.0: ICD-10-CM

## 2022-01-13 DIAGNOSIS — I35.0: Primary | ICD-10-CM

## 2022-01-13 DIAGNOSIS — J44.9: ICD-10-CM

## 2022-01-13 PROCEDURE — 93306 TTE W/DOPPLER COMPLETE: CPT

## 2022-01-14 NOTE — CARD
MR#: [id number]

Account#: [id number]

Accession#: [id number]

Date of Study: 01/13/2022

Ordering Physician: RAY BLAKE, 

Referring Physician: RAY BLAKE, 

Tech: Madalyn Giles [facility]





--------------- APPROVED REPORT --------------





EXAM: Two-dimensional and M-mode echocardiogram with Doppler and color Doppler.



Other Information 

Quality : AverageHR: 70bpm



INDICATION

COPD  

Atrial Fibrillation



Surgery/Intervention

Pacemaker: Date: 2021



RISK FACTORS

Hypertension 

Hyperlipidemia



2D DIMENSIONS 

RVDd3.9 (2.9-3.5cm)Left Atrium(2D)3.6 (1.6-4.0cm)

IVSd0.9 (0.7-1.1cm)Aortic Root(2D)3.0 (2.0-3.7cm)

LVDd4.7 (3.9-5.9cm)LVOT Diameter2.0 (1.8-2.4cm)

PWd0.9 (0.7-1.1cm)LVDs2.5 (2.5-4.0cm)

FS (%) 46.2 %SV79.5 ml

LVEF(%)77.6 (>50%)



Aortic Valve

AoV Peak Winston.136.9cm/sAoV VTI27.9cm

AO Peak GR.7.5mmHgLVOT Peak Winston.100.8cm/s

LVOT  VTI 20.01cmAO Mean GR.4mmHg

DEMETRIA (VMAX)1.08fc8SPP   (VTI)2.25cm2



Mitral Valve

MV E Ipyexkfm52.0cm/sMV DECEL [id number]

MV A Bmohxryj11.6cm/sMV E Mean Gr.2mmHg

MV UFO35duG/A  Ratio0.8

MVA (PHT)3.71cm2



TDI

E/Lateral E'12.4E/Medial E'11.1



Pulmonary Valve

PV Peak Rmjfxrer52.6cm/sPV Peak Grad.4mmHg



Tricuspid Valve

TR P. Arasktoi187lq/sRAP BPODMJQQ9ikUu

TR Peak Gr.59teZvUQXX78zdBh



Pulmonary Vein

S1 Dvvggqms29.3cm/sD2 Yyghprcc44.3cm/s

PVa awfeurtl449csfb



 LEFT VENTRICLE 

The left ventricle is normal size. There is normal left ventricular wall thickness. The left ventricu
lar systolic function is normal. The Ejection Fraction is 55-60%. There is normal LV segmental wall m
otion. Transmitral Doppler flow pattern is Grade I-abnormal relaxation pattern.



 RIGHT VENTRICLE 

The right ventricle is mildly dilated. There is normal right ventricular wall thickness. The right ve
ntricular systolic function is normal. There is a pacemaker lead in the right ventricle.



 ATRIA 

The left atrium size is normal. The right atrium size is normal. The interatrial septum is intact wit
h no evidence for an atrial septal defect or patent foramen ovale as noted on 2-D or Doppler imaging.




 AORTIC VALVE 

The aortic valve is calcified but opens well. Doppler and Color Flow revealed trace aortic regurgitat
ion. There is no significant aortic valvular stenosis. Calculated aortic valve area is 1.88 cm2 with 
maximum pressure gradient of 11 mmHg and mean pressure gradient of 6 mmHg.



 MITRAL VALVE 

The mitral valve is normal in structure and function. There is no evidence of mitral valve prolapse. 
There is no mitral valve stenosis. Doppler and Color-flow revealed trace mitral regurgitation.



 TRICUSPID VALVE 

The tricuspid valve is normal in structure and function. Doppler and Color Flow revealed trace tricus
pid regurgitation with an estimated PAP of 32 mmHg. There is no tricuspid valve stenosis.



 GREAT VESSELS 

The aortic root is normal in size. The ascending aorta is normal in size. The IVC is normal in size a
nd collapses >50% with inspiration.



 PERICARDIAL EFFUSION 

There is no evidence of significant pericardial effusion.



Critical Notification

Critical Value: No



<Conclusion>

The left ventricular systolic function is normal.

The Ejection Fraction is 55-60%.

There is normal LV segmental wall motion.

Pacer wire noted RA/RV.

Transmitral Doppler flow pattern is Grade I-abnormal relaxation pattern.

Trace mitral regurgitation.

Trace tricuspid regurgitation with an estimated PAP of 32 mmHg.

There is no evidence of significant pericardial effusion.



Signed by : Ray Blake, 

Electronically Approved : 01/14/2022 11:00:36

## 2022-05-12 ENCOUNTER — HOSPITAL ENCOUNTER (EMERGENCY)
Dept: HOSPITAL 61 - ER | Age: 67
Discharge: HOME | End: 2022-05-12
Payer: MEDICARE

## 2022-05-12 VITALS
SYSTOLIC BLOOD PRESSURE: 131 MMHG | DIASTOLIC BLOOD PRESSURE: 62 MMHG | SYSTOLIC BLOOD PRESSURE: 131 MMHG | DIASTOLIC BLOOD PRESSURE: 62 MMHG

## 2022-05-12 VITALS — WEIGHT: 231.71 LBS | BODY MASS INDEX: 36.37 KG/M2 | HEIGHT: 67 IN
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
## 2022-05-12 DIAGNOSIS — G89.11: ICD-10-CM

## 2022-05-12 DIAGNOSIS — K21.9: ICD-10-CM

## 2022-05-12 DIAGNOSIS — Z87.891: ICD-10-CM

## 2022-05-12 DIAGNOSIS — E78.00: ICD-10-CM

## 2022-05-12 DIAGNOSIS — M25.521: Primary | ICD-10-CM
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
## 2022-05-12 DIAGNOSIS — J44.9: ICD-10-CM

## 2022-05-12 DIAGNOSIS — I10: ICD-10-CM

## 2022-05-12 PROCEDURE — A4565 SLINGS: HCPCS

## 2022-05-12 PROCEDURE — 99284 EMERGENCY DEPT VISIT MOD MDM: CPT

## 2022-05-12 PROCEDURE — 73070 X-RAY EXAM OF ELBOW: CPT

## 2022-05-12 PROCEDURE — 73080 X-RAY EXAM OF ELBOW: CPT

## 2022-05-12 PROCEDURE — 29125 APPL SHORT ARM SPLINT STATIC: CPT

## 2022-05-12 PROCEDURE — 73030 X-RAY EXAM OF SHOULDER: CPT

## 2022-05-12 PROCEDURE — 96375 TX/PRO/DX INJ NEW DRUG ADDON: CPT

## 2022-05-12 PROCEDURE — 96374 THER/PROPH/DIAG INJ IV PUSH: CPT

## 2022-05-12 NOTE — RAD
Right shoulder 3 views, right elbow 3 views.



HISTORY:.



HISTORY: Trauma, pain, fell on right elbow



Right shoulder



3 views were taken of the right shoulder. There is not evidence of an acute fracture or dislocation o
r acute osseous abnormality.



Right elbow



3 views were taken of the right elbow. There is a posterior dislocation of the elbow. There is a frac
ture at the coronoid process of the ulna. There are small joint bodies. Fat pads at the elbow are dis
placed.



IMPRESSION:

1. Posterior dislocation of the elbow.

2. Fracture at the anterior proximal ulna at the coronoid process.

3. No fracture or dislocation at the shoulder.



Electronically signed by: Chriss Winchester MD (5/12/2022 9:16 AM) [id number]

## 2022-05-12 NOTE — RAD
Right shoulder 3 views, right elbow 3 views.



HISTORY:.



HISTORY: Trauma, pain, fell on right elbow



Right shoulder



3 views were taken of the right shoulder. There is not evidence of an acute fracture or dislocation o
r acute osseous abnormality.



Right elbow



3 views were taken of the right elbow. There is a posterior dislocation of the elbow. There is a frac
ture at the coronoid process of the ulna. There are small joint bodies. Fat pads at the elbow are dis
placed.



IMPRESSION:

1. Posterior dislocation of the elbow.

2. Fracture at the anterior proximal ulna at the coronoid process.

3. No fracture or dislocation at the shoulder.



Electronically signed by: Chriss Winchester MD (5/12/2022 9:16 AM) [id number]

## 2022-05-12 NOTE — RAD
EXAMINATION: XR ELBOW_RIGHT.



HISTORY: 67 years  Female  Reason: post splint- WITH NURSE @1123 



COMPARISON:  Well hours earlier.



FINDINGS:

Interval closed reduction is performed with the splint placement. The coronoid the process fracture a
nd the fracture fragment near the lateral epicondyles is seen. Positioning is suboptimal however ther
e is no definite alignment abnormality. No dislocation at this point.



IMPRESSION:

Post closed reduction and splint placement. Coronoid process fracture and tiny fracture fragment near
 the lateral epicondyle and condyle seen.



Electronically signed by: Baltazar Akbar MD (5/12/2022 12:26 PM) [id number]

## 2022-05-24 ENCOUNTER — HOSPITAL ENCOUNTER (OUTPATIENT)
Dept: HOSPITAL 61 - CT | Age: 67
End: 2022-05-24
Attending: ORTHOPAEDIC SURGERY
Payer: MEDICARE
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
## 2022-05-24 DIAGNOSIS — S53.104A: Primary | ICD-10-CM
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
## 2022-05-24 DIAGNOSIS — S52.041A: ICD-10-CM
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
## 2022-05-24 PROCEDURE — 73200 CT UPPER EXTREMITY W/O DYE: CPT

## 2022-05-24 NOTE — RAD
EXAMINATION:  CT RIGHT UPPER EXTREMITY WO, 5/24/2022 8:51 AM



CLINICAL INDICATION:  Prior right elbow dislocations



COMPARISON: Right elbow radiograph 5/12/2022



TECHNIQUE: Helical CT imaging performed of the right elbow without the use of intravenous contrast. S
agittal and coronal reformats were obtained. 



One or more of the following individualized dose reduction techniques were utilized for this examinat
ion:  



1. Automated exposure control

2. Adjustment of the mA and/or kV according to patient size

3. Use of iterative reconstruction technique.



FINDINGS: The exam is limited by body habitus. Alignment is normal. There is a displaced coronoid pro
cess fracture fragment measuring 1.1 x 0.7 x 1.5 cm (image 72 sagittal series and image 76 axial seri
es). There is questionable offset at the articular surface of the distal humerus between the trochlea
 and capitellum, which could be a minimally displaced fracture (image 87, series 7), although this co
uld also be due to artifact as there is no other definite intra-articular distal humerus fracture is 
seen in the other imaging planes. Small amount of ossification along the posterior medial aspect of t
he humerus is also indeterminate and could be degenerative or tiny osseous fragments (image 46, serie
s 6). No definite radial fracture. Subcutaneous soft tissues normal.



IMPRESSION: 

1. Displaced coronoid process fracture.

2. Limited exam due to body habitus and artifact. There is questionable offset of the articular surfa
ce of the distal humerus that could be a minimally displaced fracture or due to artifact. There is al
so a small amount of ossification along the posterior medial aspect of the distal humerus that is ind
eterminate and could be degenerative or tiny osseous fragments. 



Electronically signed by: Trinidad Reno MD (5/24/2022 11:34 AM) [facility]

## 2024-11-14 NOTE — CARD
MR#: [id number]

Account#: [id number]

Accession#: [id number]

Date of Study: 09/23/2020

Ordering Physician: RAY VELEZ, 

Referring Physician: RAY VELEZ 

Tech: Enid Miller RDCS





--------------- APPROVED REPORT --------------





EXAM: Two-dimensional and M-mode echocardiogram with Doppler and color Doppler.



Other Information 

Quality : Good



INDICATION

Atrial Fibrillation



2D DIMENSIONS 

RVDd3.5 (2.9-3.5cm)Left Atrium(2D)3.7 (1.6-4.0cm)

IVSd1.1 (0.7-1.1cm)Aortic Root(2D)2.8 (2.0-3.7cm)

LVDd4.1 (3.9-5.9cm)LVOT Diameter2.0 (1.8-2.4cm)

PWd1.0 (0.7-1.1cm)LVDs2.5 (2.5-4.0cm)

FS (%) 39.2 %SV50.9 ml

LVEF(%)60.0 (>50%)



Aortic Valve

AoV Peak Winston.172.1cm/sAoV VTI36.3cm

AO Peak GR.11.8mmHgLVOT Peak Winston.153.7cm/s

AO Mean GR.5mmHgAVA (VMAX)2.88cm2

DEMETRIA   (VTI)3.10cm2



Mitral Valve

MV E Lmetyyml581.8cm/sMV DECEL [id number]

MV A Jwqrrfwd847.8cm/sE/A  Ratio1.0



Pulmonary Vein

S1 Izijuxxz06.7cm/sD2 Wfhzbeet97.2cm/s



 LEFT VENTRICLE 

The left ventricle is normal size. There is normal left ventricular wall thickness. The left ventricu
lar systolic function is normal and the ejection fraction is within normal range. The Ejection Fracti
on is 55-60%. There is normal LV segmental wall motion. Transmitral Doppler flow pattern is Grade I-a
bnormal relaxation pattern.



 RIGHT VENTRICLE 

The right ventricle is normal size. The right ventricular systolic function is normal.



 ATRIA 

The left atrium size is normal. The right atrium size is normal. The interatrial septum is intact wit
h no evidence for an atrial septal defect or patent foramen ovale as noted on 2-D or Doppler imaging.




 AORTIC VALVE 

The aortic valve is calcified but opens well. Doppler and Color Flow revealed no significant aortic r
egurgitation. There is no significant aortic valvular stenosis.



 MITRAL VALVE 

The mitral valve is calcified but opens well. There is no evidence of mitral valve prolapse. There is
 no mitral valve stenosis. Doppler and Color-flow revealed trace mitral regurgitation.



 TRICUSPID VALVE 

The tricuspid valve is normal in structure and function. Doppler and Color Flow revealed no tricuspid
 valve regurgitation noted. There is no tricuspid valve stenosis.



 PULMONIC VALVE 

The pulmonic valve is not well visualized. Doppler and Color Flow revealed no pulmonic valvular regur
gitation. There is no pulmonic valvular stenosis.



 GREAT VESSELS 

The aortic root is normal in size. The ascending aorta is normal in size. The IVC is normal in size a
nd collapses >50% with inspiration.



 PERICARDIAL EFFUSION 

There is no evidence of significant pericardial effusion.



Critical Notification

Critical Value: No



<Conclusion>

The left ventricle is normal size.

The left ventricular systolic function is normal and the ejection fraction is within normal range.

The Ejection Fraction is 55-60%.

Doppler and Color Flow revealed no significant aortic regurgitation.

There is no significant aortic valvular stenosis.

Doppler and Color-flow revealed trace mitral regurgitation.

Doppler and Color Flow revealed no tricuspid valve regurgitation noted.



Signed by : Franki Botello MD

Electronically Approved : 09/24/2020 12:58:21 98

## 2025-07-25 NOTE — PHYS DOC
Past Medical History


Past Medical History:  Anxiety, Asthma, COPD, Depression, GERD, High [place]

sterol, Hypertension, Other


Additional Past Medical Histor:  TREMOR


Past Surgical History:  No Surgical History


Additional Past Surgical Histo:  left hip rx


Smoking Status:  Former Smoker


Alcohol Use:  None


Drug Use:  None





General Adult


EDM:


Chief Complaint:  MECHANICAL FALL





HPI:


HPI:





Patient is a 67  year old female who presents with right elbow pain after 

sustaining a fall in her yard while she was using a weed Adriana as a cane.  

Patient states that she uses a cane at baseline for ambulation however she used 

a weed Adriana for a cane since it was nearby.  Patient states that it slipped 

and she lost her balance and fell on an outstretched right hand.  She states she

had right elbow pain immediately after.  She was able to call for help.  She did

not hit her head, she did not lose consciousness, she did not have any other 

symptoms.  She states it was a mechanical fall, she has had several in the last 

few years.





Review of Systems:


Review of Systems:


Constitutional:   Denies fever or chills. []


Eyes:   Denies change in visual acuity. []


HENT:   Denies nasal congestion or sore throat. [] 


Respiratory:   Denies cough or shortness of breath. [] 


Cardiovascular:   Denies chest pain or edema. [] 


GI:   Denies abdominal pain, nausea, vomiting, bloody stools or diarrhea. [] 


:  Denies dysuria. [] 


Musculoskeletal:   Denies back pain, positive right elbow pain [] 


Integument:   Denies rash. [] 


Neurologic:   Denies headache, focal weakness or sensory changes. [] 


Endocrine:   Denies polyuria or polydipsia. [] 


Lymphatic:  Denies swollen glands. [] 


Psychiatric:  Denies depression or anxiety. []





Heart Score:


C/O Chest Pain:  No


Risk Factors:


Risk Factors:  DM, Current or recent (<one month) smoker, HTN, HLP, family 

history of CAD, obesity.


Risk Scores:


Score 0 - 3:  2.5% MACE over next 6 weeks - Discharge Home


Score 4 - 6:  20.3% MACE over next 6 weeks - Admit for Clinical Observation


Score 7 - 10:  72.7% MACE over next 6 weeks - Early Invasive Strategies





Current Medications:





Current Medications








 Medications


  (Trade)  Dose


 Ordered  Sig/Radha  Start Time


 Stop Time Status Last Admin


Dose Admin


 


 Acetaminophen


  (Tylenol)  1,000 mg  1X  ONCE  5/12/22 10:45


 5/12/22 10:46 DC 5/12/22 10:58


1,000 MG


 


 Hydromorphone HCl


  (Dilaudid)  1 mg  1X  ONCE  5/12/22 11:00


 5/12/22 11:01 DC 5/12/22 11:01


1 MG


 


 Morphine Sulfate


  (Morphine


 Sulfate)  4 mg  1X  ONCE  5/12/22 09:00


 5/12/22 09:02 DC 5/12/22 09:21


4 MG











Allergies:


Allergies:





Allergies








Coded Allergies Type Severity Reaction Last Updated Verified


 


  diltiazem Allergy Severe severe bradycardia w/syncope 5/12/22 Yes


 


  regadenoson Allergy Severe LIPS SWELLED AND HIVES ON CHEST AND ARMS 5/12/22 

Yes


 


  citalopram Allergy Intermediate hives 5/12/22 Yes


 


  Latex, Natural Rubber Allergy Mild hives 5/12/22 Yes


 


  I S O L A T I O N *CONTACT* Allergy Unknown  5/12/22 Yes











Physical Exam:


PE:





Constitutional: Well developed, well nourished, no acute distress, non-toxic 

appearance. []


HENT: Normocephalic, atraumatic, bilateral external ears normal, oropharynx 

moist, no oral exudates, nose normal. []


Eyes: PERRLA, EOMI, conjunctiva normal, no discharge. [] 


Neck: Normal range of motion, no tenderness, supple, no stridor. [] 


Cardiovascular:Heart rate regular rhythm, no murmur []


Lungs & Thorax:  Bilateral breath sounds clear to auscultation []


Abdomen: Bowel sounds normal, soft, no tenderness, no masses, no pulsatile 

masses. [] 


Skin: Warm, dry, no erythema, no rash. [] 


Back: No tenderness, no CVA tenderness. [] 


Extremities: Positive tenderness at right elbow, obvious deformity noted [] 

neurovascularly intact after reduction and splinting


Neurologic: Alert and oriented X 3, normal motor function, normal sensory 

function, no focal deficits noted. []


Psychologic: Affect normal, judgement normal, mood normal. []





Current Patient Data:


Vital Signs:





                                   Vital Signs








  Date Time  Temp Pulse Resp B/P (MAP) Pulse Ox O2 Delivery O2 Flow Rate FiO2


 


5/12/22 11:01   18  98 Room Air  


 


5/12/22 08:30 98.1 66  131/62 (85)    





 98.1       











EKG:


EKG:


[]





Radiology/Procedures:


Radiology/Procedures:


X-ray consistent with right elbow dislocation


Displaced fracture of the coronoid process





Follow-up x-ray: satisfactory alignment post splinting








Reduction of right elbow: Patient placed in prone position, no anesthetic was 

used.  Downward traction of the elbow along with manipulation at the posterior 

elbow resulted in good alignment and reduction.  Patient tolerated procedure 

well.  Patient had good pain relief post procedurally.


Impression:


Right elbow dislocation and fracture of coronoid process





Course & Med Decision Making:


Course & Med Decision Making


Pertinent Labs and Imaging studies reviewed. (See chart for details)





X-rays reviewed, mechanical fall is likely diagnosis.  Reduction resulted in 

good outcome with good pain relief.  Patient placed in a splint with a sling 

with a follow-up to orthopedics.  Patient stated that she would follow-up with 

orthopedics.  Patient encouraged to return to the emergency department she is 

having new or evolving symptoms.  All questions answered.  Patient agreed with 

the plan of action.  Patient discharged in stable condition.





Dragon Disclaimer:


Dragon Disclaimer:


This electronic medical record was generated, in whole or in part, using a voice

 recognition dictation system.





Departure


Departure


Referrals:  


SHIRIN ALTMAN MD (PCP)











WHITLEY SÁNCHEZ MD            May 12, 2022 11:49 Patient returned from IR procedure, continues to have itching from contrast. IR nurse to update MD and request for additional dose of medication    0945- Patient given additional dose of benadryl, continues to have itching, very uncomfortable, per patient also takes atarax at home, orders received for one time dose of atarax.    10:36 Patient continuing to have itching, unable to obtain prn atarax from tube system, patient opted to discharge as was waiting for medication